# Patient Record
Sex: FEMALE | Race: WHITE | Employment: FULL TIME | URBAN - METROPOLITAN AREA
[De-identification: names, ages, dates, MRNs, and addresses within clinical notes are randomized per-mention and may not be internally consistent; named-entity substitution may affect disease eponyms.]

---

## 2017-11-17 ENCOUNTER — TELEPHONE (OUTPATIENT)
Dept: NEUROSURGERY | Facility: CLINIC | Age: 32
End: 2017-11-17

## 2017-11-17 DIAGNOSIS — M54.16 LUMBAR RADICULAR PAIN: Primary | ICD-10-CM

## 2017-11-17 NOTE — TELEPHONE ENCOUNTER
Spoke to patient informed her that message will be sent to Sandhya Swartz CNP for her preference and will get back to her on Monday. Patient verbalized understanding.

## 2017-11-17 NOTE — TELEPHONE ENCOUNTER
REASON FOR CALL:  Pt saw Sandhya about a year ago and she was referred to get an MRI; states she never got the MRI. However, now she would like to have an MRI done for the same issue, but the order has . Can she get a new order for one without being seen or will she need to come in and re-evaluate with Sandhya again? She can do either that is required.     Detailed message can be left:  YES

## 2017-11-21 NOTE — TELEPHONE ENCOUNTER
Ok per Sandhya Swartz CNP to order new lumbar MRI and will call patient with results and plan. Called and discussed with patient. She verbalized understanding. Order placed in EPIC. Provided patient with central scheduling number to set up MRI.

## 2017-11-22 ENCOUNTER — HOSPITAL ENCOUNTER (OUTPATIENT)
Dept: MRI IMAGING | Facility: CLINIC | Age: 32
Discharge: HOME | End: 2017-11-22
Attending: NURSE PRACTITIONER | Admitting: NURSE PRACTITIONER
Payer: COMMERCIAL

## 2017-11-22 DIAGNOSIS — M54.16 LUMBAR RADICULAR PAIN: ICD-10-CM

## 2017-11-22 PROCEDURE — 72148 MRI LUMBAR SPINE W/O DYE: CPT

## 2017-11-28 ENCOUNTER — TELEPHONE (OUTPATIENT)
Dept: NEUROSURGERY | Facility: CLINIC | Age: 32
End: 2017-11-28

## 2017-11-29 ENCOUNTER — TELEPHONE (OUTPATIENT)
Dept: NEUROSURGERY | Facility: CLINIC | Age: 32
End: 2017-11-29

## 2017-11-29 DIAGNOSIS — M54.50 CHRONIC RIGHT-SIDED LOW BACK PAIN WITHOUT SCIATICA: Primary | ICD-10-CM

## 2017-11-29 DIAGNOSIS — G89.29 CHRONIC RIGHT-SIDED LOW BACK PAIN WITHOUT SCIATICA: Primary | ICD-10-CM

## 2017-11-29 NOTE — TELEPHONE ENCOUNTER
Patient returned call to review MRI results.  Lumbar MRI results normal.  Patient interested in PT, will place order.

## 2018-01-22 ENCOUNTER — THERAPY VISIT (OUTPATIENT)
Dept: PHYSICAL THERAPY | Facility: CLINIC | Age: 33
End: 2018-01-22
Payer: COMMERCIAL

## 2018-01-22 DIAGNOSIS — G89.29 CHRONIC BILATERAL LOW BACK PAIN WITHOUT SCIATICA: Primary | ICD-10-CM

## 2018-01-22 DIAGNOSIS — M54.50 CHRONIC BILATERAL LOW BACK PAIN WITHOUT SCIATICA: Primary | ICD-10-CM

## 2018-01-22 PROCEDURE — 97110 THERAPEUTIC EXERCISES: CPT | Mod: GP | Performed by: PHYSICAL THERAPIST

## 2018-01-22 PROCEDURE — 97161 PT EVAL LOW COMPLEX 20 MIN: CPT | Mod: GP | Performed by: PHYSICAL THERAPIST

## 2018-01-22 PROCEDURE — 97112 NEUROMUSCULAR REEDUCATION: CPT | Mod: GP | Performed by: PHYSICAL THERAPIST

## 2018-01-22 NOTE — PROGRESS NOTES
[facility] Initial Evaluation  Subjective:  Patient is a 32 year old female presenting with rehab back hpi. The history is provided by the patient. No  was used.   Reva Garza is a 32 year old female with a lumbar condition.  Occurance: noticed onset of LBP 2 years ago after giving birth to her son.  Condition occurred: for unknown reasons.  This is a chronic condition  Most recent exacerbation of symptoms occurred approximately one month ago for reasons unknown.  Patient was referred to PT 11/29/17.    Patient reports pain:  Lumbar spine right, lumbar spine left and central lumbar spine.  Radiates to: tingling in bilateral buttocks at times.  Pain is described as aching and is constant and reported as 6/10.  Associated symptoms:  Tingling. Pain is worse in the P.M. and worse in the A.M..  Symptoms are exacerbated by bending, lifting, sitting and other (sit to stand transitions, especially when sitting on floor with her son; coughing) and relieved by heat.  Since onset symptoms are gradually worsening.  Special tests:  MRI (normal).      General health as reported by patient is good.  Pertinent medical history includes:  None.    Surgical history: Appendectomy, breast augmentation.  Current medications:  None as reported by the patient.  Current occupation is .  Patient is working in normal job without restrictions.  Primary job tasks include:  Prolonged sitting and other (computer work).    Barriers include:  None as reported by the patient.    Red flags:  Pain at rest/night.                        Objective:        Flexibility/Screens:   Positive screens:  LumbarNegative screens: Hip         Lower Extremity:  Normal        Physical Exam      [place] Lumbar Evaluation    Posture:  Sitting: poor  Standing: fair  Lordosis: WNL  Lateral Shift: no  Correction of Posture: better  Other Observations: Mild mid lumbar scoliosis convex right  Movement  Loss:  Flexion (Flex): nil and pain  Extension (EXT): major and pain  Side Ramah R (SG R): nil  Side Glide L (SG L): nil and pain  Test Movements:  FIS: Pretest Movements: Bilat LBP        EIL: During: increases    Repeat EIL: During: decreases  After: better  Mechanical Response: IncROM        Conclusion: derangement  Principle of Treatment:  Posture Correction: Slouch-overcorrect 3x/day; use of lumbar roll in sitting    Extension: Repeated EIL 10x every 2-3 hrs      General Evaluation:          Lower Extremity Flexibility:  normal                                                                             ROS    Assessment/Plan:    Patient is a 32 year old female with lumbar complaints.    Patient has the following significant findings with corresponding treatment plan.                Diagnosis 1:  LBP  Pain -  self management, education, directional preference exercise and home program  Decreased ROM/flexibility - therapeutic exercise  Decreased function - therapeutic activities and home program  Impaired posture - neuro re-education    Therapy Evaluation Codes:   1) History comprised of:   Personal factors that impact the plan of care:      Time since onset of symptoms.    Comorbidity factors that impact the plan of care are:      None.     Medications impacting care: None.  2) Examination of Body Systems comprised of:   Body structures and functions that impact the plan of care:      Lumbar spine.   Activity limitations that impact the plan of care are:      Bending, Lifting, Sitting and sit to stand transfers.  3) Clinical presentation characteristics are:   Stable/Uncomplicated.  4) Decision-Making    Low complexity using standardized patient assessment instrument and/or measureable assessment of functional outcome.  Cumulative Therapy Evaluation is: Low complexity.    Previous and current functional limitations:  (See Goal Flow Sheet for this information)    Short term and Long term goals: (See Goal Flow  Sheet for this information)     Communication ability:  Patient appears to be able to clearly communicate and understand verbal and written communication and follow directions correctly.  Treatment Explanation - The following has been discussed with the patient:    RX ordered/plan of care  Anticipated outcomes  Possible risks and side effects  This patient would benefit from PT intervention to resume normal activities.   Rehab potential is good.    Frequency:  1 X week, once daily  Duration:  for 6 weeks  Discharge Plan:  Achieve all LTG.  Independent in home treatment program.  Reach maximal therapeutic benefit.    Please refer to the daily flowsheet for treatment today, total treatment time and time spent performing 1:1 timed codes.

## 2018-01-22 NOTE — MR AVS SNAPSHOT
After Visit Summary   1/22/2018    Reva Garza    MRN: [id number]           Patient Information     Date Of Birth          1985        Visit Information        Provider Department      1/22/2018 4:30 PM Rissa Hung PT [facility]        Today's Diagnoses     Chronic bilateral low back pain without sciatica    -  1       Follow-ups after your visit        Who to contact     If you have questions or need follow up information about today's clinic visit or your schedule please contact [facility] directly at [phone].  Normal or non-critical lab and imaging results will be communicated to you by [employer], letter or phone within 4 business days after the clinic has received the results. If you do not hear from us within 7 days, please contact the clinic through [employer] or phone. If you have a critical or abnormal lab result, we will notify you by phone as soon as possible.  Submit refill requests through [employer] or call your pharmacy and they will forward the refill request to us. Please allow 3 business days for your refill to be completed.          Additional Information About Your Visit        MyChart Information     [employer] gives you secure access to your electronic health record. If you see a primary care provider, you can also send messages to your care team and make appointments. If you have questions, please call your primary care clinic.  If you do not have a primary care provider, please call [phone] and they will assist you.        Care EveryWhere ID     This is your Care EveryWhere ID. This could be used by other organizations to access your [facility] medical records  [id number]         Blood Pressure from Last 3 Encounters:   12/12/16 143/90   10/12/16 125/88   10/12/16 126/87    Weight from Last 3 Encounters:   12/12/16 61.9 kg (136 lb 6.4 oz)   10/12/16 61.5 kg (135 lb 9.6 oz)   10/12/16 61.2 kg (135  cornelius)              We Performed the Following     HC PT EVAL, LOW COMPLEXITY     CRISTAL INITIAL EVAL REPORT     NEUROMUSCULAR RE-EDUCATION     THERAPEUTIC EXERCISES        Primary Care Provider Office Phone # Fax #    [facility] [phone] [address] [address] [address]        Equal Access to Services     KARINA SUTTON : Hadii aad ku hadasho Soomaali, waaxda luqadaha, qaybta kaalmada adeegyada, waxay elisein hayantonion marci jeaninejudy kenney . So [facility] [phone].    ATENCIÓN: Si habla español, tiene a schulte disposición servicios gratuitos de asistencia lingüística. Stuart al [phone].    We comply with applicable federal civil rights laws and [place] laws. We do not discriminate on the basis of race, color, national origin, age, disability, sex, sexual orientation, or gender identity.            Thank you!     Thank you for choosing [facility]  for your care. Our goal is always to provide you with excellent care. Hearing back from our patients is one way we can continue to improve our services. Please take a few minutes to complete the written survey that you may receive in the mail after your visit with us. Thank you!             Your Updated Medication List - Protect others around you: Learn how to safely use, store and throw away your medicines at www.disposemymeds.org.          This list is accurate as of: 1/22/18  5:17 PM.  Always use your most recent med list.                   Brand Name Dispense Instructions for use Diagnosis    busPIRone 5 MG tablet    BUSPAR    90 tablet    Take 1-2 tablets (5-10 mg) by mouth 3 times daily    Abdominal pain, epigastric       Dapsone 5 % Gel           hyoscyamine 0.125 MG sublingual tablet    LEVSIN/SL    60 tablet    Place 1 tablet (0.125 mg) under the tongue every 4 hours as needed for cramping    Abdominal cramping, Constipation, unspecified constipation type       norethindrone-ethinyl estradiol 1-20 MG-MCG  per tablet    JUNEL FE 1/20     Take 1 tablet by mouth daily        penicillin V potassium 500 MG tablet    VEETID    20 tablet    Take 1 tablet (500 mg) by mouth 2 times daily    Throat pain       spironolactone 100 MG tablet    ALDACTONE     Take 100 mg by mouth 2 times daily

## 2018-02-06 ENCOUNTER — THERAPY VISIT (OUTPATIENT)
Dept: PHYSICAL THERAPY | Facility: CLINIC | Age: 33
End: 2018-02-06
Payer: COMMERCIAL

## 2018-02-06 DIAGNOSIS — G89.29 CHRONIC BILATERAL LOW BACK PAIN WITHOUT SCIATICA: ICD-10-CM

## 2018-02-06 DIAGNOSIS — M54.50 CHRONIC BILATERAL LOW BACK PAIN WITHOUT SCIATICA: ICD-10-CM

## 2018-02-06 PROCEDURE — 97110 THERAPEUTIC EXERCISES: CPT | Mod: GP | Performed by: PHYSICAL THERAPIST

## 2018-02-06 PROCEDURE — 97112 NEUROMUSCULAR REEDUCATION: CPT | Mod: GP | Performed by: PHYSICAL THERAPIST

## 2018-03-03 ENCOUNTER — HEALTH MAINTENANCE LETTER (OUTPATIENT)
Age: 33
End: 2018-03-03

## 2018-03-05 ENCOUNTER — THERAPY VISIT (OUTPATIENT)
Dept: PHYSICAL THERAPY | Facility: CLINIC | Age: 33
End: 2018-03-05
Payer: COMMERCIAL

## 2018-03-05 DIAGNOSIS — G89.29 CHRONIC BILATERAL LOW BACK PAIN WITHOUT SCIATICA: ICD-10-CM

## 2018-03-05 DIAGNOSIS — M54.50 CHRONIC BILATERAL LOW BACK PAIN WITHOUT SCIATICA: ICD-10-CM

## 2018-03-05 PROCEDURE — 97110 THERAPEUTIC EXERCISES: CPT | Mod: GP | Performed by: PHYSICAL THERAPIST

## 2018-05-30 ASSESSMENT — ENCOUNTER SYMPTOMS
INCREASED ENERGY: 1
DIARRHEA: 1
CONSTIPATION: 1
BLOATING: 1
FATIGUE: 1
NAUSEA: 1
ABDOMINAL PAIN: 1
HEARTBURN: 1

## 2018-06-13 ENCOUNTER — OFFICE VISIT (OUTPATIENT)
Dept: GASTROENTEROLOGY | Facility: CLINIC | Age: 33
End: 2018-06-13
Payer: COMMERCIAL

## 2018-06-13 VITALS
HEART RATE: 84 BPM | WEIGHT: 141.5 LBS | OXYGEN SATURATION: 96 % | DIASTOLIC BLOOD PRESSURE: 97 MMHG | TEMPERATURE: 98.4 F | HEIGHT: 66 IN | SYSTOLIC BLOOD PRESSURE: 127 MMHG | BODY MASS INDEX: 22.74 KG/M2

## 2018-06-13 DIAGNOSIS — R10.84 ABDOMINAL PAIN, GENERALIZED: Primary | ICD-10-CM

## 2018-06-13 RX ORDER — LOPERAMIDE HCL 2 MG
2 CAPSULE ORAL
COMMUNITY
Start: 2015-07-05

## 2018-06-13 RX ORDER — HYOSCYAMINE SULFATE 0.125 MG
0.125-0.25 TABLET ORAL 2 TIMES DAILY PRN
Qty: 40 TABLET | Refills: 1 | Status: SHIPPED | OUTPATIENT
Start: 2018-06-13

## 2018-06-13 RX ORDER — ACETAMINOPHEN 325 MG/1
325-650 TABLET ORAL
COMMUNITY
Start: 2015-07-05 | End: 2018-11-14

## 2018-06-13 RX ORDER — ERGOCALCIFEROL (VITAMIN D2) 50 MCG
CAPSULE ORAL
Refills: 0 | COMMUNITY
Start: 2018-05-03

## 2018-06-13 RX ORDER — DAPSONE 75 MG/G
GEL TOPICAL
Refills: 3 | COMMUNITY
Start: 2018-05-02 | End: 2018-11-14

## 2018-06-13 RX ORDER — ONDANSETRON 4 MG/1
TABLET, ORALLY DISINTEGRATING ORAL
Refills: 0 | COMMUNITY
Start: 2018-01-09 | End: 2018-11-14

## 2018-06-13 ASSESSMENT — PAIN SCALES - GENERAL: PAINLEVEL: NO PAIN (0)

## 2018-06-13 NOTE — PROGRESS NOTES
GI CLINIC VISIT    CC/REFERRING MD:  Referred Self  REASON FOR CONSULTATION:   Referred Self for   Chief Complaint   Patient presents with     Gastrointestinal Problem     Follow up for Irritable Bowel Syndrom       ASSESSMENT/PLAN:  Ms. Ravi Garza is a 31 yo woman presenting for follow up of chronic functional abdominal pain, GERD and idiopathic constipation under good control with Miralax.  She did have an isolated episode of enterocolitis that self-resolved about 6 months ago; she had a similar episode a year ago.     # Constipation  Alternating between imodium (for travel) and Miralax  --Prior to travel, stop Miralax x 2-3 days to avoid imodium use. This will simplify medication use and will hopefully provide good bowel control.     # GERD on PPI with persistent symptoms  --Take omeprazole 20 mg twice daily on empty stomach 30-60 min before food. OK to add H2B QHS.     # Anxiety with chronic abdominal discomfort  --Referral to Dr. Reva Nogueira   --Hyoscyamine prescribed, per patient request     # Enterocolitis, several episodes   If diarrheal illnesses recurs, consider testing for IgA deficiency and giardia.     RTC: Return if symptoms worsen or fail to improve.     A total of 40 minutes, face to face, was spent with this patient, >50% of which was counseling regarding the above delineated issues.    Melina Esparza MD   of Medicine  Division of Gastroenterology, Hepatology and Nutrition  [facility]      HPI  Ms. Ravi Garza is a 31 yo woman previously seen by Noemí Borjas (last seen 10/2016 for constipatio) presenting for follow up.     In Jan 2018, Ms. Ravi Garza presented to the ED ([facility]) for diarrhea and abdominal pain. CT a/p showed enterocolitis. Treated with supportive therapy. Symptoms self-resolved. Denies any travel. Son was sick with similar symptoms prior to presentation.     Bowel habits have been well-controlled with alternating use of Miralax and  loperamide.   Generally, uses Miralax daily to achieve a BM daily.  When she travels, she takes empiric imodium (given histroy of incontinence). This will lead to no BMs x 2 days. Afterwards, she takes Miralax to regain stooling.  Endorses occasional hemorrhoids without pain. No blood per rectum or pain with defecation.    Endorses chronic, generalized abdominal discomfort related to anxiety. Tried Buspar for anxiety; had side effects in the form of palpitations. In the past, she tried probiotic (flurastor) and had diarrhea. No weight loss.     Takes omeprazole 20 mg BID for belching/reflux. Takes this on an empty stomach before breakfast and after dinner. Symptoms worst after dinner.      ROS:    No fevers or chills  No weight loss  No blurry vision, double vision or change in vision  No sore throat  No lymphadenopathy  No headache, paraesthesias, or weakness in a limb  No shortness of breath or wheezing  No chest pain or pressure  No arthralgias or myalgias  No rashes or skin changes  No odynophagia or dysphagia  No BRBPR, hematochezia, melena  No dysuria, frequency or urgency  No hot/cold intolerance or polyria  + anxiety    PROBLEM LIST  Patient Active Problem List    Diagnosis Date Noted     Chronic bilateral low back pain without sciatica 01/22/2018     Priority: Medium     CARDIOVASCULAR SCREENING; LDL GOAL LESS THAN 160 02/10/2010     Priority: Medium     External hemorrhoids 11/06/2005     Priority: Medium     Problem list name updated by automated process. Provider to review       Irritable bowel syndrome 11/06/2005     Priority: Medium     PERTINENT PAST MEDICAL HISTORY:  Past Medical History:   Diagnosis Date     Corpus luteum cyst or hematoma 1/2004    ruptured with ER eval and resolution, had f/u with GYN physician Dr. Arroyo.        PREVIOUS SURGERIES:  Past Surgical History:   Procedure Laterality Date     APPENDECTOMY  7/2011     BREAST SURGERY       PREVIOUS ENDOSCOPY:  cscope at [facility] about 10  years ago and it was normal.   EGD in 2015 with bx normal    ALLERGIES:  Allergies   Allergen Reactions     No Known Drug Allergies      PERTINENT MEDICATIONS:    Current Outpatient Prescriptions:      acetaminophen (TYLENOL) 325 MG tablet, Take 325-650 mg by mouth, Disp: , Rfl:      ACZONE 7.5 % gel, APPLY A PEA SIZED AMOUNT TO FULL FACE AND THIN LAYER TO CHEST QD FOR ACNE, Disp: , Rfl: 3     Dapsone 5 % GEL, , Disp: , Rfl:      hyoscyamine (ANASPAZ/LEVSIN) 0.125 MG tablet, Take 1-2 tablets (125-250 mcg) by mouth 2 times daily as needed for cramping, Disp: 40 tablet, Rfl: 1     loperamide (IMODIUM) 2 MG capsule, 2 mg, Disp: , Rfl:      norethindrone-ethinyl estradiol (JUNEL FE 1/20) 1-20 MG-MCG per tablet, Take 1 tablet by mouth daily, Disp: , Rfl:      ondansetron (ZOFRAN-ODT) 4 MG ODT tab, DIS ONE T PO Q 8 H PRN N, Disp: , Rfl: 0     spironolactone (ALDACTONE) 100 MG tablet, Take 100 mg by mouth 2 times daily, Disp: , Rfl:      tazarotene 0.05 % CREA cream, , Disp: , Rfl:      vitamin D (ERGOCALCIFEROL) 64661 UNIT capsule, TK 1 C PO ONCE WEEKLY, Disp: , Rfl: 0     busPIRone (BUSPAR) 5 MG tablet, Take 1-2 tablets (5-10 mg) by mouth 3 times daily (Patient not taking: Reported on 6/13/2018), Disp: 90 tablet, Rfl: 1     hyoscyamine (LEVSIN/SL) 0.125 MG SL tablet, Place 1 tablet (0.125 mg) under the tongue every 4 hours as needed for cramping (Patient not taking: Reported on 6/13/2018), Disp: 60 tablet, Rfl: 1    SOCIAL HISTORY:  Social History     Social History     Marital status:      Spouse name: N/A     Number of children: 0     Years of education: N/A     Occupational History     Not on file.     Social History Main Topics     Smoking status: Never Smoker     Smokeless tobacco: Never Used     Alcohol use 0.0 oz/week     0 Standard drinks or equivalent per week      Comment: socially     Drug use: No     Sexual activity: Yes     Partners: Male     Birth control/ protection: Pill      Comment: BCP- ortho  "earline     Other Topics Concern     Not on file     Social History Narrative     FAMILY HISTORY:  Family History   Problem Relation Age of Onset     Hypertension Mother      Lipids Mother      Breast Cancer Mother 61     breast     DIABETES Father      Hypertension Father      Lipids Father      Hypertension Paternal Grandmother      Lipids Paternal Grandmother      HEART DISEASE Maternal Grandfather      Lipids Paternal Grandfather      Hypertension Paternal Grandfather      Irritable Bowel Syndrome Father      and brother     Gallbladder Disease Paternal Grandfather      GMa, 2aunts, cousin     Thyroid Disease Maternal Grandmother      Depression Mother      Past/family/social history reviewed and no changes    PHYSICAL EXAMINATION:  Constitutional: aaox3, cooperative, pleasant, not dyspneic/diaphoretic, no acute distress  Vitals reviewed: BP (!) 127/97 (BP Location: Left arm, Patient Position: Sitting, Cuff Size: Adult Regular)  Pulse 84  Temp 98.4  F (36.9  C) (Oral)  Ht 1.676 m (5' 6\")  Wt 64.2 kg (141 lb 8 oz)  SpO2 96%  BMI 22.84 kg/m2  Wt:   Wt Readings from Last 2 Encounters:   06/13/18 64.2 kg (141 lb 8 oz)   12/12/16 61.9 kg (136 lb 6.4 oz)      Eyes: Sclera anicteric/injected  Ears/nose/mouth/throat: Normal oropharynx without ulcers or exudate, mucus membranes moist, hearing intact  Neck: supple, thyroid normal size  CV: No edema  Respiratory: Unlabored breathing  Lymph: No axillary, submandibular, supraclavicular or inguinal lymphadenopathy  Abd: soft, nondistended, +bs, no hepatosplenomegaly, nontender, no peritoneal signs  Skin: warm, perfused, no jaundice  Psych: Normal affect  MSK: Normal gait  Rectal: declined    PERTINENT STUDIES:  Office Visit on 12/12/2016   Component Date Value Ref Range Status     Specimen Description 12/12/2016 Throat   Final     Rapid Strep A Screen 12/12/2016    Final                    Value:NEGATIVE: No Group A streptococcal antigen detected by immunoassay, await   " culture report.       Micro Report Status 12/12/2016 FINAL 12/12/2016   Final     Specimen Description 12/12/2016 Throat   Final     Culture Micro 12/12/2016 No Beta Streptococcus isolated   Final     Micro Report Status 12/12/2016 FINAL 12/14/2016   Final     Mononucleosis Screen 12/12/2016 Negative  NEG Final     Influenza A/B Agn Specimen 12/12/2016 Nasopharyngeal   Final     Influenza A 12/12/2016   NEG Final                    Value:Negative   Test results must be correlated with clinical data. If necessary, results   should be confirmed by a molecular assay or viral culture.       Influenza B 12/12/2016   NEG Final                    Value:Negative   Test results must be correlated with clinical data. If necessary, results   should be confirmed by a molecular assay or viral culture.

## 2018-06-13 NOTE — PATIENT INSTRUCTIONS
--Prior to travel, consider stopping Miralax for two-three days and avoid imodium use.   --Referral to Dr. Nogueira for anxiety  --Continue omeprazole 20 mg. Take this twice daily on an empty stomach 30-60 minutes before food. If additional coverage is needed, OK to take pepcid or zantac.

## 2018-06-13 NOTE — NURSING NOTE
"Chief Complaint   Patient presents with     Gastrointestinal Problem     Follow up for Irritable Bowel Syndrom       Vitals:    06/13/18 1612   BP: (!) 127/97   BP Location: Left arm   Patient Position: Sitting   Cuff Size: Adult Regular   Pulse: 84   Temp: 98.4  F (36.9  C)   TempSrc: Oral   SpO2: 96%   Weight: 64.2 kg (141 lb 8 oz)   Height: 1.676 m (5' 6\")       Body mass index is 22.84 kg/(m^2).      Christa Gomez LPN                          "

## 2018-06-13 NOTE — LETTER
6/13/2018       RE: Reva Garza  [address] [place] [address]     Dear Colleague,    Thank you for referring your patient, Reva Garza, to the [facility] GASTROENTEROLOGY AND IBD CLINIC at [facility]. Please see a copy of my visit note below.    GI CLINIC VISIT    CC/REFERRING MD:  Referred Self  REASON FOR CONSULTATION:   Referred Self for   Chief Complaint   Patient presents with     Gastrointestinal Problem     Follow up for Irritable Bowel Syndrom       ASSESSMENT/PLAN:  Ms. Ravi Garza is a 33 yo woman presenting for follow up of chronic functional abdominal pain, GERD and idiopathic constipation under good control with Miralax.  She did have an isolated episode of enterocolitis that self-resolved about 6 months ago; she had a similar episode a year ago.     # Constipation  Alternating between imodium (for travel) and Miralax  --Prior to travel, stop Miralax x 2-3 days to avoid imodium use. This will simplify medication use and will hopefully provide good bowel control.     # GERD on PPI with persistent symptoms  --Take omeprazole 20 mg twice daily on empty stomach 30-60 min before food. OK to add H2B QHS.     # Anxiety with chronic abdominal discomfort  --Referral to Dr. Reva Nogueira   --Hyoscyamine prescribed, per patient request     # Enterocolitis, several episodes   If diarrheal illnesses recurs, consider testing for IgA deficiency and giardia.     RTC: Return if symptoms worsen or fail to improve.     A total of 40 minutes, face to face, was spent with this patient, >50% of which was counseling regarding the above delineated issues.    Melina Esparza MD   of Medicine  Division of Gastroenterology, Hepatology and Nutrition  [facility]      HPI  Ms. Ravi Garza is a 33 yo woman previously seen by Noemí Borjas (last seen 10/2016 for constipatio) presenting for follow up.     In Jan 2018, Ms. Ravi Garza  presented to the ED ([facility]) for diarrhea and abdominal pain. CT a/p showed enterocolitis. Treated with supportive therapy. Symptoms self-resolved. Denies any travel. Son was sick with similar symptoms prior to presentation.     Bowel habits have been well-controlled with alternating use of Miralax and loperamide.   Generally, uses Miralax daily to achieve a BM daily.  When she travels, she takes empiric imodium (given histroy of incontinence). This will lead to no BMs x 2 days. Afterwards, she takes Miralax to regain stooling.  Endorses occasional hemorrhoids without pain. No blood per rectum or pain with defecation.    Endorses chronic, generalized abdominal discomfort related to anxiety. Tried Buspar for anxiety; had side effects in the form of palpitations. In the past, she tried probiotic (flurastor) and had diarrhea. No weight loss.     Takes omeprazole 20 mg BID for belching/reflux. Takes this on an empty stomach before breakfast and after dinner. Symptoms worst after dinner.      PROBLEM LIST  Patient Active Problem List    Diagnosis Date Noted     Chronic bilateral low back pain without sciatica 01/22/2018     Priority: Medium     CARDIOVASCULAR SCREENING; LDL GOAL LESS THAN 160 02/10/2010     Priority: Medium     External hemorrhoids 11/06/2005     Priority: Medium     Problem list name updated by automated process. Provider to review       Irritable bowel syndrome 11/06/2005     Priority: Medium     PERTINENT PAST MEDICAL HISTORY:  Past Medical History:   Diagnosis Date     Corpus luteum cyst or hematoma 1/2004    ruptured with ER eval and resolution, had f/u with GYN physician Dr. Arroyo.        PREVIOUS SURGERIES:  Past Surgical History:   Procedure Laterality Date     APPENDECTOMY  7/2011     BREAST SURGERY       PREVIOUS ENDOSCOPY:  cscope at [facility] about 10 years ago and it was normal.   EGD in 2015 with bx normal    ALLERGIES:  Allergies   Allergen Reactions     No Known Drug Allergies       PERTINENT MEDICATIONS:    Current Outpatient Prescriptions:      acetaminophen (TYLENOL) 325 MG tablet, Take 325-650 mg by mouth, Disp: , Rfl:      ACZONE 7.5 % gel, APPLY A PEA SIZED AMOUNT TO FULL FACE AND THIN LAYER TO CHEST QD FOR ACNE, Disp: , Rfl: 3     Dapsone 5 % GEL, , Disp: , Rfl:      hyoscyamine (ANASPAZ/LEVSIN) 0.125 MG tablet, Take 1-2 tablets (125-250 mcg) by mouth 2 times daily as needed for cramping, Disp: 40 tablet, Rfl: 1     loperamide (IMODIUM) 2 MG capsule, 2 mg, Disp: , Rfl:      norethindrone-ethinyl estradiol (JUNEL FE 1/20) 1-20 MG-MCG per tablet, Take 1 tablet by mouth daily, Disp: , Rfl:      ondansetron (ZOFRAN-ODT) 4 MG ODT tab, DIS ONE T PO Q 8 H PRN N, Disp: , Rfl: 0     spironolactone (ALDACTONE) 100 MG tablet, Take 100 mg by mouth 2 times daily, Disp: , Rfl:      tazarotene 0.05 % CREA cream, , Disp: , Rfl:      vitamin D (ERGOCALCIFEROL) 27342 UNIT capsule, TK 1 C PO ONCE WEEKLY, Disp: , Rfl: 0     busPIRone (BUSPAR) 5 MG tablet, Take 1-2 tablets (5-10 mg) by mouth 3 times daily (Patient not taking: Reported on 6/13/2018), Disp: 90 tablet, Rfl: 1     hyoscyamine (LEVSIN/SL) 0.125 MG SL tablet, Place 1 tablet (0.125 mg) under the tongue every 4 hours as needed for cramping (Patient not taking: Reported on 6/13/2018), Disp: 60 tablet, Rfl: 1    SOCIAL HISTORY:  Social History     Social History     Marital status:      Spouse name: N/A     Number of children: 0     Years of education: N/A     Occupational History     Not on file.     Social History Main Topics     Smoking status: Never Smoker     Smokeless tobacco: Never Used     Alcohol use 0.0 oz/week     0 Standard drinks or equivalent per week      Comment: socially     Drug use: No     Sexual activity: Yes     Partners: Male     Birth control/ protection: Pill      Comment: BCP- ortho trinesa     Other Topics Concern     Not on file     Social History Narrative     FAMILY HISTORY:  Family History   Problem Relation  "Age of Onset     Hypertension Mother      Lipids Mother      Breast Cancer Mother 61     breast     DIABETES Father      Hypertension Father      Lipids Father      Hypertension Paternal Grandmother      Lipids Paternal Grandmother      HEART DISEASE Maternal Grandfather      Lipids Paternal Grandfather      Hypertension Paternal Grandfather      Irritable Bowel Syndrome Father      and brother     Gallbladder Disease Paternal Grandfather      GMa, 2aunts, cousin     Thyroid Disease Maternal Grandmother      Depression Mother      Past/family/social history reviewed and no changes    PHYSICAL EXAMINATION:  Constitutional: aaox3, cooperative, pleasant, not dyspneic/diaphoretic, no acute distress  Vitals reviewed: BP (!) 127/97 (BP Location: Left arm, Patient Position: Sitting, Cuff Size: Adult Regular)  Pulse 84  Temp 98.4  F (36.9  C) (Oral)  Ht 1.676 m (5' 6\")  Wt 64.2 kg (141 lb 8 oz)  SpO2 96%  BMI 22.84 kg/m2  Wt:   Wt Readings from Last 2 Encounters:   06/13/18 64.2 kg (141 lb 8 oz)   12/12/16 61.9 kg (136 lb 6.4 oz)      Eyes: Sclera anicteric/injected  Ears/nose/mouth/throat: Normal oropharynx without ulcers or exudate, mucus membranes moist, hearing intact  Neck: supple, thyroid normal size  CV: No edema  Respiratory: Unlabored breathing  Lymph: No axillary, submandibular, supraclavicular or inguinal lymphadenopathy  Abd: soft, nondistended, +bs, no hepatosplenomegaly, nontender, no peritoneal signs  Skin: warm, perfused, no jaundice  Psych: Normal affect  MSK: Normal gait  Rectal: declined    PERTINENT STUDIES:  Office Visit on 12/12/2016   Component Date Value Ref Range Status     Specimen Description 12/12/2016 Throat   Final     Rapid Strep A Screen 12/12/2016    Final                    Value:NEGATIVE: No Group A streptococcal antigen detected by immunoassay, await   culture report.       Micro Report Status 12/12/2016 FINAL 12/12/2016   Final     Specimen Description 12/12/2016 Throat   Final     " Culture Micro 12/12/2016 No Beta Streptococcus isolated   Final     Micro Report Status 12/12/2016 FINAL 12/14/2016   Final     Mononucleosis Screen 12/12/2016 Negative  NEG Final     Influenza A/B Agn Specimen 12/12/2016 Nasopharyngeal   Final     Influenza A 12/12/2016   NEG Final                    Value:Negative   Test results must be correlated with clinical data. If necessary, results   should be confirmed by a molecular assay or viral culture.       Influenza B 12/12/2016   NEG Final                    Value:Negative   Test results must be correlated with clinical data. If necessary, results   should be confirmed by a molecular assay or viral culture.         Again, thank you for allowing me to participate in the care of your patient.      Sincerely,    Melina Esparza MD

## 2018-06-13 NOTE — MR AVS SNAPSHOT
After Visit Summary   6/13/2018    Reva Garza    MRN: [id number]           Patient Information     Date Of Birth          1985        Visit Information        Provider Department      6/13/2018 4:20 PM Melina Esparza MD [facility] Gastroenterology and IBD [facility]        Today's Diagnoses     Abdominal pain, generalized    -  1      Care Instructions    --Prior to travel, consider stopping Miralax for two-three days and avoid imodium use.   --Referral to Dr. Nogueira for anxiety  --Continue omeprazole 20 mg. Take this twice daily on an empty stomach 30-60 minutes before food. If additional coverage is needed, OK to take pepcid or zantac.           Follow-ups after your visit        Follow-up notes from your care team     Return if symptoms worsen or fail to improve.      Who to contact     Please call your clinic at [phone] to:    Ask questions about your health    Make or cancel appointments    Discuss your medicines    Learn about your test results    Speak to your doctor            Additional Information About Your Visit        [employer] Information     [employer] gives you secure access to your electronic health record. If you see a primary care provider, you can also send messages to your care team and make appointments. If you have questions, please call your primary care clinic.  If you do not have a primary care provider, please call [phone] and they will assist you.      [employer] is an electronic gateway that provides easy, online access to your medical records. With [employer], you can request a clinic appointment, read your test results, renew a prescription or communicate with your care team.     To access your existing account, please contact your [facility] or call [phone] for assistance.        Care EveryWhere ID     This is your Care EveryWhere ID. This could be used by other organizations to access your [facility]  "records  [id number]        Your Vitals Were     Pulse Temperature Height Pulse Oximetry BMI (Body Mass Index)       84 98.4  F (36.9  C) (Oral) 1.676 m (5' 6\") 96% 22.84 kg/m2        Blood Pressure from Last 3 Encounters:   06/13/18 (!) 127/97   12/12/16 143/90   10/12/16 125/88    Weight from Last 3 Encounters:   06/13/18 64.2 kg (141 lb 8 oz)   12/12/16 61.9 kg (136 lb 6.4 oz)   10/12/16 61.5 kg (135 lb 9.6 oz)              Today, you had the following     No orders found for display         Today's Medication Changes          These changes are accurate as of 6/13/18  5:06 PM.  If you have any questions, ask your nurse or doctor.               These medicines have changed or have updated prescriptions.        Dose/Directions    * hyoscyamine 0.125 MG sublingual tablet   Commonly known as:  LEVSIN/SL   This may have changed:  Another medication with the same name was added. Make sure you understand how and when to take each.   Used for:  Abdominal cramping, Constipation, unspecified constipation type   Changed by:  Melina Esparza MD        Dose:  0.125 mg   Place 1 tablet (0.125 mg) under the tongue every 4 hours as needed for cramping   Quantity:  60 tablet   Refills:  1       * hyoscyamine 0.125 MG tablet   Commonly known as:  ANASPAZ/LEVSIN   This may have changed:  You were already taking a medication with the same name, and this prescription was added. Make sure you understand how and when to take each.   Used for:  Abdominal pain, generalized   Changed by:  Melina Esparza MD        Dose:  0.125-0.25 mg   Take 1-2 tablets (125-250 mcg) by mouth 2 times daily as needed for cramping   Quantity:  40 tablet   Refills:  1       * Notice:  This list has 2 medication(s) that are the same as other medications prescribed for you. Read the directions carefully, and ask your doctor or other care provider to review them with you.         Where to get your medicines      These medications were sent to [id number] - [place] - [address]  [address] [place] [address]     Phone:  [phone]     hyoscyamine 0.125 MG tablet                Primary Care Provider Office Phone # Fax #    [facility] [place] [phone] [address] [address]        Equal Access to Services     [facility] : Hadii aad ku hadasho Soomaali, waaxda luqadaha, qaybta kaalmada adeegyada, waxay idiin hayaan adeeg kharash la'aan . So [facility] [phone].    ATENCIÓN: Si habla español, tiene a schulte disposición servicios gratuitos de asistencia lingüística. [facility] [phone].    We comply with applicable federal civil rights laws and [place] laws. We do not discriminate on the basis of race, color, national origin, age, disability, sex, sexual orientation, or gender identity.            Thank you!     Thank you for choosing [facility] GASTROENTEROLOGY AND IBD CLINIC  for your care. Our goal is always to provide you with excellent care. Hearing back from our patients is one way we can continue to improve our services. Please take a few minutes to complete the written survey that you may receive in the mail after your visit with us. Thank you!             Your Updated Medication List - Protect others around you: Learn how to safely use, store and throw away your medicines at www.disposemymeds.org.          This list is accurate as of 6/13/18  5:06 PM.  Always use your most recent med list.                   Brand Name Dispense Instructions for use Diagnosis    acetaminophen 325 MG tablet    TYLENOL     Take 325-650 mg by mouth        busPIRone 5 MG tablet    BUSPAR    90 tablet    Take 1-2 tablets (5-10 mg) by mouth 3 times daily    Abdominal pain, epigastric       * Dapsone 5 % Gel           * ACZONE 7.5 % gel   Generic drug:  dapsone      APPLY A PEA SIZED AMOUNT TO FULL FACE AND THIN LAYER TO CHEST QD FOR ACNE        * hyoscyamine  0.125 MG sublingual tablet    LEVSIN/SL    60 tablet    Place 1 tablet (0.125 mg) under the tongue every 4 hours as needed for cramping    Abdominal cramping, Constipation, unspecified constipation type       * hyoscyamine 0.125 MG tablet    ANASPAZ/LEVSIN    40 tablet    Take 1-2 tablets (125-250 mcg) by mouth 2 times daily as needed for cramping    Abdominal pain, generalized       loperamide 2 MG capsule    IMODIUM     2 mg        norethindrone-ethinyl estradiol 1-20 MG-MCG per tablet    JUNEL FE 1/20     Take 1 tablet by mouth daily        ondansetron 4 MG ODT tab    ZOFRAN-ODT     DIS ONE T PO Q 8 H PRN N        spironolactone 100 MG tablet    ALDACTONE     Take 100 mg by mouth 2 times daily        tazarotene 0.05 % Crea cream           vitamin D 03412 UNIT capsule    ERGOCALCIFEROL     TK 1 C PO ONCE WEEKLY        * Notice:  This list has 4 medication(s) that are the same as other medications prescribed for you. Read the directions carefully, and ask your doctor or other care provider to review them with you.

## 2018-06-27 ASSESSMENT — ANXIETY QUESTIONNAIRES
6. BECOMING EASILY ANNOYED OR IRRITABLE: SEVERAL DAYS
GAD7 TOTAL SCORE: 9
GAD7 TOTAL SCORE: 9
4. TROUBLE RELAXING: SEVERAL DAYS
7. FEELING AFRAID AS IF SOMETHING AWFUL MIGHT HAPPEN: SEVERAL DAYS
2. NOT BEING ABLE TO STOP OR CONTROL WORRYING: MORE THAN HALF THE DAYS
5. BEING SO RESTLESS THAT IT IS HARD TO SIT STILL: NOT AT ALL
7. FEELING AFRAID AS IF SOMETHING AWFUL MIGHT HAPPEN: SEVERAL DAYS
GAD7 TOTAL SCORE: 9
1. FEELING NERVOUS, ANXIOUS, OR ON EDGE: MORE THAN HALF THE DAYS
3. WORRYING TOO MUCH ABOUT DIFFERENT THINGS: MORE THAN HALF THE DAYS

## 2018-06-27 ASSESSMENT — PATIENT HEALTH QUESTIONNAIRE - PHQ9
SUM OF ALL RESPONSES TO PHQ QUESTIONS 1-9: 3
10. IF YOU CHECKED OFF ANY PROBLEMS, HOW DIFFICULT HAVE THESE PROBLEMS MADE IT FOR YOU TO DO YOUR WORK, TAKE CARE OF THINGS AT HOME, OR GET ALONG WITH OTHER PEOPLE: NOT DIFFICULT AT ALL
SUM OF ALL RESPONSES TO PHQ QUESTIONS 1-9: 3

## 2018-06-28 ASSESSMENT — ANXIETY QUESTIONNAIRES: GAD7 TOTAL SCORE: 9

## 2018-06-28 ASSESSMENT — PATIENT HEALTH QUESTIONNAIRE - PHQ9: SUM OF ALL RESPONSES TO PHQ QUESTIONS 1-9: 3

## 2018-06-29 ENCOUNTER — OFFICE VISIT (OUTPATIENT)
Dept: GASTROENTEROLOGY | Facility: CLINIC | Age: 33
End: 2018-06-29
Payer: COMMERCIAL

## 2018-06-29 DIAGNOSIS — F41.9 ANXIETY: Primary | ICD-10-CM

## 2018-06-29 NOTE — PROGRESS NOTES
"  Health Psychology                  Clinic    Department of Medicine  Zoë Rodríguez, PhD, LP [phone]                          Clinics and Surgery Center  [facility] Mehran Morton, PhD, LP [phone]                  3rd Floor  [place] Mail Code 741   Jerson Suresh, PhD, ABPP, LP [phone]     [address] [facility],   [address] [facility],  Reva Nogueira,  PhD, LP [phone]            [place] [address] Aliyah Ledezma, PhD, LP [phone]     Confidential Summary of Standard Psychodiagnostic Evaluation*    Referral Source:  Melina Esparza MD    Reason for Referral:  Anxiety related to GI illness    Sources of Information:  Information was obtained from a clinical interview with the patient, review of the [facility] medical record, and administration of psychological assessments.     History of Presenting Concerns:  Reva Garza is a 32 year old female with past GI diagnoses of IBS, chronic functional abdominal pain, GERD, and idiopathic constipation presenting for psychological evaluation related to impact of anxiety on GI symptoms.  Regarding history of GI problems, she stated that as a child she frequently had stomach aches and a pediatrician or family doctor told her and her family it was due to the high degree of worry that she had as a child.  However GI symptoms first significantly impacted daily activities during her first year of college.  This was when she began treatment with a gastroenterologist, first participating in extensive diagnostic workup at [facility].  She reported \"bouncing around\" to various clinics, last seen in the gastroenterology clinic at the [facility] from [id number] by MINDY Brown CNP and by Melina Choudhary MD on 6/13/2018.     Currently, she reported a near constant stomachache with fluctuating constipation and diarrhea.  She yesenia with these symptoms " by using medication such as Imodium, which she reported starts in unhelpful cycle of increasing constipation, following which she uses MiraLAX, which results in loose stools.  She previously reported trying BuSpar, but discontinued due to disliking side effects of increased heart rate and nausea.  She previously tried Linzess, which she stated resulted in severe diarrhea.  She reported considering food changes, stating that she has attempted to cut out dairy and coffee but finds it difficult to do so because she likes these foods.  She also reported trying probiotics, which she believed was contributing to increased diarrhea.    She reported that the impact of GI symptoms is significant anxiety about fecal incontinence, which results in avoiding situations in which she does not have easy access to a restroom.  She also reported significant worry during her daily commute, which can be 45 minutes in the morning to 90 minutes in the afternoon.  She worries about having an episode of fecal incontinence and reported constant worrying about what would happen if she did not make it to the bathroom, if she will have access to a bathroom, which she would do with her 3-year-old who is often with her in the car if she had to stop and use the restroom.  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  She reported symptoms have historically flared around stressful times in life such as following her sister's unexpected death approximately 10 years ago during law school, her father's health issues, and discord in her marriage.  Currently she reported that tension in her marriage and her father's recent health issues have been significant stressors.    She also reported that she has significantly limited her ability to go on vacations or take longer outings  outside of her home.  She reported that her symptoms have resulted in worsening of mood, frustration with herself for her ongoing GI problems, and increased irritability.  She reported limited understanding by her ; however she reported that her dad and brother have similar GI complaints and thus she feels understood by these family members when she talks about the impacts of her GI symptoms.    Medical History:    Past Medical History:   Diagnosis Date     Corpus luteum cyst or hematoma 1/2004    ruptured with ER eval and resolution, had f/u with GYN physician Dr. Arroyo.        Past Surgical History:   Procedure Laterality Date     APPENDECTOMY  7/2011     BREAST SURGERY         Current Outpatient Prescriptions   Medication     acetaminophen (TYLENOL) 325 MG tablet     ACZONE 7.5 % gel     busPIRone (BUSPAR) 5 MG tablet     Dapsone 5 % GEL     hyoscyamine (ANASPAZ/LEVSIN) 0.125 MG tablet     hyoscyamine (LEVSIN/SL) 0.125 MG SL tablet     loperamide (IMODIUM) 2 MG capsule     norethindrone-ethinyl estradiol (JUNEL FE 1/20) 1-20 MG-MCG per tablet     ondansetron (ZOFRAN-ODT) 4 MG ODT tab     spironolactone (ALDACTONE) 100 MG tablet     tazarotene 0.05 % CREA cream     vitamin D (ERGOCALCIFEROL) 69690 UNIT capsule     No current facility-administered medications for this visit.        Psychiatric History:  She denied a history of previous diagnosis or treatment for a mood or anxiety disorder or other psychiatric illness.  She reported strong history of depression in both her maternal and paternal family.  She reported a period of depressed mood and bereavement following her sister's unexpected death approximately 10 years ago and acknowledged that this issue may be unresolved in both of her parents.  She and her parents attended 1 supportive counseling session following her sister's death.     Substance Use History:  She denied current or past history of substance misuse.  She reported minimal use of alcohol,  no use of recreational drugs or tobacco products, and drinking 1 caffeinated beverage per day.     Social History:  She currently lives at home with her  of 4 years and 3-year-old son in [facility].  She reported discord in her marriage, which is a stressor.  Her father was hospitalized in the spring for numerous health conditions, and was recently diagnosed with dementia.  She visits her family's home frequently in order to provide caregiving and support to her parents, which she stated is also stressful.  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  Highest level of education is a law degree.    Psychological Assessment:  The patient completed the following battery of assessments during this psychological evaluation: World Health Organization Disability Assessment Schedule 2.0 12-item (WHODAS), Patient Health Questionnaire-9 (PHQ-9), Generalized Anxiety Disorder-7 screener (NARENDRA-7), and the CAGE Questionnaire Adapted to Include Drugs (CAGE-AID).    PHQ-9 SCORE 6/27/2018   Total Score MyChart 3 (Minimal depression)   Total Score 3       NARENDRA-7 SCORE 6/27/2018   Total Score 9 (mild anxiety)   Total Score 9       WHODAS 2.0 TOTAL SCORES 6/29/2018   Total Score 19       Mental Status Examination:  Appearance/Behavior/Orientation: Patient was on time, appropriately groomed and dressed, and demonstrated good eye contact. Alert and oriented to person, place, time, and situation. No evidence of psychomotor agitation.     Cooperation/Reliability: Patient was open and cooperative throughout the interview.    Speech/Language: Speech was clear, coherent, and of normal rate, rhythm and volume.    Thought Form: Overall logical and organized.   Thought Content: Appropriate to interview and situation (e.g., appropriately focused on health and weight).  Perception: Patient denied any difficulties with a thought disorder, including auditory or visual hallucinations.    Cognition/Memory: Not formally assessed, but no difficulties apparent upon interview.   Attention/Concentration: Good throughout interview.    Fund of knowledge: Consistent with age and level of education.    Abstract reasoning: Not assessed.   Judgment: Intact.    Mood/Affect: Mood euthymic; appropriate range of affect.    Insight/Motivation: Good, good  Suicide/Assault: Patient denies suicidal or assaultive ideation, plan, or intent    Impression:  Reva Garza is a 32 year old with functional GI illness and co-occurring anxiety.  She reports the exacerbation of GI illness during stress.  She reported significant limitations in social functioning, mood, and anxiety due to GI illness.  She also reported a tendency to worry and catastrophize the experience of GI symptoms. It appears that she would benefit from cognitive behavioral interventions to improve her ability to manage GI symptoms and anxiety.    Diagnosis:  Anxiety, unspecified    Recommendation/Plan:  Provided psychoeducation about the rationale and course of care for cognitive behavioral therapy for anxiety and dysfunctional GI illness.  Provided psychoeducation about relaxation training, including diaphragmatic breathing as a relaxation training exercise.  Provided recommendation for bibliotherapy CBT for IBS.  She agreed with treatment recommendations.     Reva Nogueira, PhD,   Clinical Health Psychologist    *In accordance with the Rules of the [place] [employer], it is noted that psychological descriptions and scientific procedures underlying psychological evaluations have limitations.  Absolute predictions cannot be made based on information in this report.

## 2018-06-29 NOTE — MR AVS SNAPSHOT
After Visit Summary   6/29/2018    Reva Garza    MRN: [id number]           Patient Information     Date Of Birth          1985        Visit Information        Provider Department      6/29/2018 10:00 AM Reva Nogueira, PhD [facility]        Today's Diagnoses     Anxiety    -  1       Follow-ups after your visit        Your next 10 appointments already scheduled     Jul 20, 2018  4:00 PM CDT   (Arrive by 3:45 PM)   Return Visit with Reva Nogueira,     [facility] ([facility])    [address] [place] [address]   [phone]            Aug 10, 2018  4:00 PM CDT   (Arrive by 3:45 PM)   Return Visit with Reva Nogueira,    [facility] ([facility])    [address] [place] [id number] [phone]            Aug 24, 2018  2:00 PM CDT   (Arrive by 1:45 PM)   Return Visit with Reva Nogueira, PhD   [facility] ([facility])    [address] [place] [address]   [phone]            Sep 07, 2018  3:00 PM CDT   (Arrive by 2:45 PM)   Return Visit with Reva Nogueira, PhD   [facility] ([facility])    [address] [place] [id number] [phone]              Who to contact     Please call your clinic at [phone] to:    Ask questions about your health    Make or cancel appointments    Discuss your medicines    Learn about your test results    Speak to your doctor            Additional Information About Your Visit        MyChart Information     [employer] gives you secure access to your electronic health record. If you see a primary care provider, you can also send messages to your care team and make appointments. If you have questions,  please call your primary care clinic.  If you do not have a primary care provider, please call [phone] and they will assist you.      [employer] is an electronic gateway that provides easy, online access to your medical records. With [employer], you can request a clinic appointment, read your test results, renew a prescription or communicate with your care team.     To access your existing account, please contact your [facility] or call [phone] for assistance.        Care EveryWhere ID     This is your Care EveryWhere ID. This could be used by other organizations to access your [place] medical records  [id number]         Blood Pressure from Last 3 Encounters:   06/13/18 (!) 127/97   12/12/16 143/90   10/12/16 125/88    Weight from Last 3 Encounters:   06/13/18 64.2 kg (141 lb 8 oz)   12/12/16 61.9 kg (136 lb 6.4 oz)   10/12/16 61.5 kg (135 lb 9.6 oz)              Today, you had the following     No orders found for display       Primary Care Provider Office Phone # Fax #    [facility] [phone] [address] [facility] [address]        Equal Access to Services     KARINA SUTTON : Hadii kurt ku hadasho Soomaali, waaxda luqadaha, qaybta kaalmada adeegyada, kanu zhong. So [facility] [phone].    ATENCIÓN: Si habla español, tiene a schulte disposición servicios gratuitos de asistencia lingüística. Llame al [phone].    We comply with applicable federal civil rights laws and [place] laws. We do not discriminate on the basis of race, color, national origin, age, disability, sex, sexual orientation, or gender identity.            Thank you!     Thank you for choosing [facility]  for your care. Our goal is always to provide you with excellent care. Hearing back from our patients is one way we can continue to improve our services. Please take a few minutes to complete the written survey that  you may receive in the mail after your visit with us. Thank you!             Your Updated Medication List - Protect others around you: Learn how to safely use, store and throw away your medicines at www.disposemymeds.org.          This list is accurate as of 6/29/18 11:59 PM.  Always use your most recent med list.                   Brand Name Dispense Instructions for use Diagnosis    acetaminophen 325 MG tablet    TYLENOL     Take 325-650 mg by mouth        busPIRone 5 MG tablet    BUSPAR    90 tablet    Take 1-2 tablets (5-10 mg) by mouth 3 times daily    Abdominal pain, epigastric       * Dapsone 5 % Gel           * ACZONE 7.5 % gel   Generic drug:  dapsone      APPLY A PEA SIZED AMOUNT TO FULL FACE AND THIN LAYER TO CHEST QD FOR ACNE        * hyoscyamine 0.125 MG sublingual tablet    LEVSIN/SL    60 tablet    Place 1 tablet (0.125 mg) under the tongue every 4 hours as needed for cramping    Abdominal cramping, Constipation, unspecified constipation type       * hyoscyamine 0.125 MG tablet    ANASPAZ/LEVSIN    40 tablet    Take 1-2 tablets (125-250 mcg) by mouth 2 times daily as needed for cramping    Abdominal pain, generalized       loperamide 2 MG capsule    IMODIUM     2 mg        norethindrone-ethinyl estradiol 1-20 MG-MCG per tablet    JUNEL FE 1/20     Take 1 tablet by mouth daily        ondansetron 4 MG ODT tab    ZOFRAN-ODT     DIS ONE T PO Q 8 H PRN N        spironolactone 100 MG tablet    ALDACTONE     Take 100 mg by mouth 2 times daily        tazarotene 0.05 % Crea cream           vitamin D 19343 UNIT capsule    ERGOCALCIFEROL     TK 1 C PO ONCE WEEKLY        * Notice:  This list has 4 medication(s) that are the same as other medications prescribed for you. Read the directions carefully, and ask your doctor or other care provider to review them with you.

## 2018-06-29 NOTE — LETTER
"6/29/2018       RE: Reva Garza  [address] [address] [facility] [address]     Dear Colleague,    Thank you for referring your patient, Reva Garza, to the [facility] GASTRO[facility]OLOG[facility] IBD CLINIC at [facility]. Please see a copy of my visit note below.      Health Psychology                  Clinic    Department of Medicine  Zoë Rodríguez, PhD, LP [phone]                          Clinics and Surgery Center  [facility] Mehran Morton, PhD, LP [phone]                  3rd Floor  [place] Mail Code 741   Jerson Suresh, PhD, ABPP, LP [phone]     [address] [facility],   [address] [facility],  Reva Nogueira,  PhD, LP [phone]            [place]  [address]  [place] [address] Aliyah Ledezma, PhD, LP [phone]     Confidential Summary of Standard Psychodiagnostic Evaluation*    Referral Source:  Melina Esparza MD    Reason for Referral:  Anxiety related to GI illness    Sources of Information:  Information was obtained from a clinical interview with the patient, review of the [facility] medical record, and administration of psychological assessments.     History of Presenting Concerns:  Reva Garza is a 32 year old female with past GI diagnoses of IBS, chronic functional abdominal pain, GERD, and idiopathic constipation presenting for psychological evaluation related to impact of anxiety on GI symptoms.  Regarding history of GI problems, she stated that as a child she frequently had stomach aches and a pediatrician or family doctor told her and her family it was due to the high degree of worry that she had as a child.  However GI symptoms first significantly impacted daily activities during her first year of college.  This was when she began treatment with a gastroenterologist, first participating in extensive diagnostic workup at [facility].  She reported \"bouncing " "around\" to various clinics, last seen in the gastroenterology clinic at the [facility] from [id number] by MINDY Brown CNP and by Melina Choudhary MD on 6/13/2018.     Currently, she reported a near constant stomachache with fluctuating constipation and diarrhea.  She yesenia with these symptoms by using medication such as Imodium, which she reported starts in unhelpful cycle of increasing constipation, following which she uses MiraLAX, which results in loose stools.  She previously reported trying BuSpar, but discontinued due to disliking side effects of increased heart rate and nausea.  She previously tried Linzess, which she stated resulted in severe diarrhea.  She reported considering food changes, stating that she has attempted to cut out dairy and coffee but finds it difficult to do so because she likes these foods.  She also reported trying probiotics, which she believed was contributing to increased diarrhea.    She reported that the impact of GI symptoms is significant anxiety about fecal incontinence, which results in avoiding situations in which she does not have easy access to a restroom.  She also reported significant worry during her daily commute, which can be 45 minutes in the morning to 90 minutes in the afternoon.  She worries about having an episode of fecal incontinence and reported constant worrying about what would happen if she did not make it to the bathroom, if she will have access to a bathroom, which she would do with her 3-year-old who is often with her in the car if she had to stop and use the restroom.  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  She reported symptoms have historically flared around stressful times in life such as following her sister's unexpected death approximately 10 years " ago during law school, her father's health issues, and discord in her marriage.  Currently she reported that tension in her marriage and her father's recent health issues have been significant stressors.    She also reported that she has significantly limited her ability to go on vacations or take longer outings outside of her home.  She reported that her symptoms have resulted in worsening of mood, frustration with herself for her ongoing GI problems, and increased irritability.  She reported limited understanding by her ; however she reported that her dad and brother have similar GI complaints and thus she feels understood by these family members when she talks about the impacts of her GI symptoms.    Medical History:    Past Medical History:   Diagnosis Date     Corpus luteum cyst or hematoma 1/2004    ruptured with ER eval and resolution, had f/u with GYN physician Dr. Arroyo.        Past Surgical History:   Procedure Laterality Date     APPENDECTOMY  7/2011     BREAST SURGERY         Current Outpatient Prescriptions   Medication     acetaminophen (TYLENOL) 325 MG tablet     ACZONE 7.5 % gel     busPIRone (BUSPAR) 5 MG tablet     Dapsone 5 % GEL     hyoscyamine (ANASPAZ/LEVSIN) 0.125 MG tablet     hyoscyamine (LEVSIN/SL) 0.125 MG SL tablet     loperamide (IMODIUM) 2 MG capsule     norethindrone-ethinyl estradiol (JUNEL FE 1/20) 1-20 MG-MCG per tablet     ondansetron (ZOFRAN-ODT) 4 MG ODT tab     spironolactone (ALDACTONE) 100 MG tablet     tazarotene 0.05 % CREA cream     vitamin D (ERGOCALCIFEROL) 09526 UNIT capsule     No current facility-administered medications for this visit.        Psychiatric History:  She denied a history of previous diagnosis or treatment for a mood or anxiety disorder or other psychiatric illness.  She reported strong history of depression in both her maternal and paternal family.  She reported a period of depressed mood and bereavement following her sister's unexpected death  approximately 10 years ago and acknowledged that this issue may be unresolved in both of her parents.  She and her parents attended 1 supportive counseling session following her sister's death.     Substance Use History:  She denied current or past history of substance misuse.  She reported minimal use of alcohol, no use of recreational drugs or tobacco products, and drinking 1 caffeinated beverage per day.     Social History:  She currently lives at home with her  of 4 years and 3-year-old son in [facility] [facility] [place] [facility].  She reported discord in her marriage, which is a stressor.  Her father was hospitalized in the spring for numerous health conditions, and was recently diagnosed with dementia.  She visits her family's home frequently in order to provide caregiving and support to her parents, which she stated is also stressful.  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  Highest level of education is a law degree.    Psychological Assessment:  The patient completed the following battery of assessments during this psychological evaluation: World Health Organization Disability Assessment Schedule 2.0 12-item (WHODAS), Patient Health Questionnaire-9 (PHQ-9), Generalized Anxiety Disorder-7 screener (NARENDRA-7), and the CAGE Questionnaire Adapted to Include Drugs (CAGE-AID).    PHQ-9 SCORE 6/27/2018   Total Score MyChart 3 (Minimal depression)   Total Score 3       NARENDRA-7 SCORE 6/27/2018   Total Score 9 (mild anxiety)   Total Score 9       WHODAS 2.0 TOTAL SCORES 6/29/2018   Total Score 19       Mental Status Examination:  Appearance/Behavior/Orientation: Patient was on time, appropriately groomed and dressed, and demonstrated good eye contact. Alert and oriented to person, place, time, and situation. No evidence of psychomotor agitation.     Cooperation/Reliability: Patient was open and cooperative throughout the interview.    Speech/Language: Speech was clear, coherent, and of normal  rate, rhythm and volume.    Thought Form: Overall logical and organized.   Thought Content: Appropriate to interview and situation (e.g., appropriately focused on health and weight).  Perception: Patient denied any difficulties with a thought disorder, including auditory or visual hallucinations.   Cognition/Memory: Not formally assessed, but no difficulties apparent upon interview.   Attention/Concentration: Good throughout interview.    Fund of knowledge: Consistent with age and level of education.    Abstract reasoning: Not assessed.   Judgment: Intact.    Mood/Affect: Mood euthymic; appropriate range of affect.    Insight/Motivation: Good, good  Suicide/Assault: Patient denies suicidal or assaultive ideation, plan, or intent    Impression:  Reva Garza is a 32 year old with functional GI illness and co-occurring anxiety.  She reports the exacerbation of GI illness during stress.  She reported significant limitations in social functioning, mood, and anxiety due to GI illness.  She also reported a tendency to worry and catastrophize the experience of GI symptoms. It appears that she would benefit from cognitive behavioral interventions to improve her ability to manage GI symptoms and anxiety.    Diagnosis:  Anxiety, unspecified    Recommendation/Plan:  Provided psychoeducation about the rationale and course of care for cognitive behavioral therapy for anxiety and dysfunctional GI illness.  Provided psychoeducation about relaxation training, including diaphragmatic breathing as a relaxation training exercise.  Provided recommendation for bibliotherapy CBT for IBS.  She agreed with treatment recommendations.     Reva Nogueira, PhD,   Clinical Health Psychologist    *In accordance with the Rules of the [place] Board [employer], it is noted that psychological descriptions and scientific procedures underlying psychological evaluations have limitations.  Absolute predictions cannot be made based on  information in this report.

## 2018-11-14 ENCOUNTER — OFFICE VISIT (OUTPATIENT)
Dept: INTERNAL MEDICINE | Facility: CLINIC | Age: 33
End: 2018-11-14
Payer: COMMERCIAL

## 2018-11-14 ENCOUNTER — TELEPHONE (OUTPATIENT)
Dept: INTERNAL MEDICINE | Facility: CLINIC | Age: 33
End: 2018-11-14

## 2018-11-14 VITALS
HEART RATE: 88 BPM | RESPIRATION RATE: 16 BRPM | SYSTOLIC BLOOD PRESSURE: 114 MMHG | TEMPERATURE: 98.4 F | OXYGEN SATURATION: 98 % | WEIGHT: 147.6 LBS | DIASTOLIC BLOOD PRESSURE: 82 MMHG | BODY MASS INDEX: 23.82 KG/M2

## 2018-11-14 DIAGNOSIS — R10.13 EPIGASTRIC PAIN: Primary | ICD-10-CM

## 2018-11-14 DIAGNOSIS — K21.9 GASTROESOPHAGEAL REFLUX DISEASE, ESOPHAGITIS PRESENCE NOT SPECIFIED: Primary | ICD-10-CM

## 2018-11-14 PROCEDURE — 99214 OFFICE O/P EST MOD 30 MIN: CPT | Performed by: INTERNAL MEDICINE

## 2018-11-14 RX ORDER — FAMOTIDINE 20 MG/1
20 TABLET, FILM COATED ORAL 2 TIMES DAILY
Qty: 60 TABLET | Refills: 0 | Status: SHIPPED | OUTPATIENT
Start: 2018-11-14

## 2018-11-14 RX ORDER — OMEPRAZOLE 40 MG/1
40 CAPSULE, DELAYED RELEASE ORAL DAILY
Qty: 90 CAPSULE | Refills: 0 | Status: SHIPPED | OUTPATIENT
Start: 2018-11-14 | End: 2019-03-28

## 2018-11-14 NOTE — TELEPHONE ENCOUNTER
Yes - please submit a PA.    Patient's symptoms occurred while she was on omeprazole 20mg daily (OTC) indicating that a higher dose is necessary.

## 2018-11-14 NOTE — PATIENT INSTRUCTIONS
STOP current omeprazole.    START omeprazole 40mg daily x 2-3 months (then can go back to 20mg daily).    RECOMMEND Pepcid twice a day for next 2-4 weeks then as needed for immediate relief of symptoms (omeprazole is sllooooooowwww- acting).

## 2018-11-14 NOTE — PROGRESS NOTES
SUBJECTIVE:                                                      HPI: Reva Garza is a pleasant 33 year old female who presents with abdominal pain:    - ongoing for the last week  - epigastric in location  - present all the time  - describes as a dull ache  - fluctuates in intensity-generally improves with eating  - associated nausea, no vomiting    - no diarrhea, melena, hematochezia  - no fevers or chills  - no anorexia, unintentional weight loss, or night sweats    EGD, including biopsies, in 2015 was unremarkable.    Currently taking omeprazole 20 mg daily.    PSH significant for appendectomy.    The medication, allergy, and problem lists have been reviewed and updated as appropriate.       OBJECTIVE:                                                      /82  Pulse 88  Temp 98.4  F (36.9  C) (Oral)  Resp 16  Wt 147 lb 9.6 oz (67 kg)  LMP  (LMP Unknown)  SpO2 98%  BMI 23.82 kg/m2  Constitutional: well-appearing  Respiratory: normal respiratory effort; clear to auscultation bilaterally  Cardiovascular: regular rate and rhythm; no edema  Gastrointestinal: soft, non-tender, non-distended, and bowel sounds present; no organomegaly or masses   Psych: normal judgment and insight; normal mood and affect; recent and remote memory intact      ASSESSMENT/PLAN:                                                      (R10.13) Epigastric pain  (primary encounter diagnosis)  Comment: suspect gastritis.  Plan:    - INCREASE omeprazole to 40 mg daily x 2-3 months; can resume 20mg daily after that.   - Pepcid bid x 2-4 weeks then as needed for immediate relief of symptoms.   - if symptoms worsen, change, do not improve, patient contact MD.    The instructions on the AVS were discussed and explained to the patient. Patient expressed understanding of instructions.    (Chart documentation was completed, in part, with [employer] voice-recognition software. Even though reviewed, some grammatical, spelling, and word  errors may remain.)    Mei Dimas MD   [address] [address]  T: [phone], F: [phone]

## 2018-11-14 NOTE — TELEPHONE ENCOUNTER
Prior Authorization Retail Medication Request    Medication/Dose: omeprazole (PRILOSEC) 40 MG capsule  ICD code (if different than what is on RX):  R10.13  Previously Tried and Failed:    Rationale:      Insurance Name:  [facility]  Insurance ID:  [id number]      Pharmacy Information (if different than what is on RX)  Name:  [facility]  Phone:  [phone]

## 2018-11-14 NOTE — MR AVS SNAPSHOT
After Visit Summary   11/14/2018    Reva Garza    MRN: [id number]           Patient Information     Date Of Birth          1985        Visit Information        Provider Department      11/14/2018 9:45 AM Mei Dimas MD [facility]        Today's Diagnoses     Epigastric pain    -  1      Care Instructions    STOP current omeprazole.    START omeprazole 40mg daily x 2-3 months (then can go back to 20mg daily).    RECOMMEND Pepcid twice a day for next 2-4 weeks then as needed for immediate relief of symptoms (omeprazole is sllooooooowwww- acting).           Follow-ups after your visit        Who to contact     If you have questions or need follow up information about today's clinic visit or your schedule please contact [facility] directly at [phone].  Normal or non-critical lab and imaging results will be communicated to you by MyChart, letter or phone within 4 business days after the clinic has received the results. If you do not hear from us within 7 days, please contact the clinic through [employer] or phone. If you have a critical or abnormal lab result, we will notify you by phone as soon as possible.  Submit refill requests through [employer] or call your pharmacy and they will forward the refill request to us. Please allow 3 business days for your refill to be completed.          Additional Information About Your Visit        MyChart Information     [employer] gives you secure access to your electronic health record. If you see a primary care provider, you can also send messages to your care team and make appointments. If you have questions, please call your primary care clinic.  If you do not have a primary care provider, please call [phone] and they will assist you.        Care EveryWhere ID     This is your Care EveryWhere ID. This could be used by other organizations to access your [facility] medical records  [id number]         Your Vitals Were     Pulse Temperature Respirations Last Period Pulse Oximetry BMI (Body Mass Index)    88 98.4  F (36.9  C) (Oral) 16 (LMP Unknown) 98% 23.82 kg/m2       Blood Pressure from Last 3 Encounters:   11/14/18 114/82   06/13/18 (!) 127/97   12/12/16 143/90    Weight from Last 3 Encounters:   11/14/18 147 lb 9.6 oz (67 kg)   06/13/18 141 lb 8 oz (64.2 kg)   12/12/16 136 lb 6.4 oz (61.9 kg)              Today, you had the following     No orders found for display         Today's Medication Changes          These changes are accurate as of 11/14/18 10:02 AM.  If you have any questions, ask your nurse or doctor.               Start taking these medicines.        Dose/Directions    famotidine 20 MG tablet   Commonly known as:  PEPCID   Used for:  Epigastric pain   Started by:  Mei Dimas MD        Dose:  20 mg   Take 1 tablet (20 mg) by mouth 2 times daily   Quantity:  60 tablet   Refills:  0         These medicines have changed or have updated prescriptions.        Dose/Directions    * OMEPRAZOLE PO   This may have changed:  Another medication with the same name was added. Make sure you understand how and when to take each.   Changed by:  Mei Dimas MD        Take by mouth every morning   Refills:  0       * omeprazole 40 MG capsule   Commonly known as:  priLOSEC   This may have changed:  You were already taking a medication with the same name, and this prescription was added. Make sure you understand how and when to take each.   Used for:  Epigastric pain   Changed by:  Mei Dimas MD        Dose:  40 mg   Take 1 capsule (40 mg) by mouth daily Take 30-60 minutes before a meal.   Quantity:  90 capsule   Refills:  0       * Notice:  This list has 2 medication(s) that are the same as other medications prescribed for you. Read the directions carefully, and ask your doctor or other care provider to review them with you.         Where to get your medicines      These medications were  sent to [id number] - [place] - [address] AT [facility] [address] [place]  [address], [place] [address]     Phone:  [phone]     famotidine 20 MG tablet    omeprazole 40 MG capsule                Primary Care Provider Office Phone # Fax #    [facility] [phone] [address] [address] [address]        Equal Access to Services     [facility] LESLEY : Hadii aad ku hadasho Soomaali, waaxda luqadaha, qaybta kaalmada adeegyada, waxay idiin hayaan adeeg kharash la'aan . So [facility] [phone].    ATENCIÓN: Si habla español, tiene a schulte disposición servicios gratuitos de asistencia lingüística. [facility] [phone].    We comply with applicable federal civil rights laws and [place] laws. We do not discriminate on the basis of race, color, national origin, age, disability, sex, sexual orientation, or gender identity.            Thank you!     Thank you for choosing [facility]  for your care. Our goal is always to provide you with excellent care. Hearing back from our patients is one way we can continue to improve our services. Please take a few minutes to complete the written survey that you may receive in the mail after your visit with us. Thank you!             Your Updated Medication List - Protect others around you: Learn how to safely use, store and throw away your medicines at www.disposemymeds.org.          This list is accurate as of 11/14/18 10:02 AM.  Always use your most recent med list.                   Brand Name Dispense Instructions for use Diagnosis    Dapsone 5 % Gel           famotidine 20 MG tablet    PEPCID    60 tablet    Take 1 tablet (20 mg) by mouth 2 times daily    Epigastric pain       hyoscyamine 0.125 MG tablet    ANASPAZ/LEVSIN    40 tablet    Take 1-2 tablets (125-250 mcg) by mouth 2 times daily as needed for cramping    Abdominal pain, generalized       loperamide 2 MG  capsule    IMODIUM     2 mg        norethindrone-ethinyl estradiol 1-20 MG-MCG per tablet    JUNEL FE 1/20     Take 1 tablet by mouth daily        * OMEPRAZOLE PO      Take by mouth every morning        * omeprazole 40 MG capsule    priLOSEC    90 capsule    Take 1 capsule (40 mg) by mouth daily Take 30-60 minutes before a meal.    Epigastric pain       spironolactone 100 MG tablet    ALDACTONE     Take 100 mg by mouth 2 times daily        vitamin D2 38799 UNIT capsule    ERGOCALCIFEROL     TK 1 C PO ONCE WEEKLY        * Notice:  This list has 2 medication(s) that are the same as other medications prescribed for you. Read the directions carefully, and ask your doctor or other care provider to review them with you.

## 2018-11-14 NOTE — TELEPHONE ENCOUNTER
PCP please advise if you would like to submit PA.     Per last office visit note, looks like patient had tried 20mg once daily with no relief of current symptoms.     Please provide rationale if appropriate or inform nursing to discuss with patient if willing to purchase over the counter.     Rubia BULLOCK RN, BSN, PHN

## 2018-11-15 NOTE — TELEPHONE ENCOUNTER
Central Prior Authorization Team   Phone: [phone]      PA Initiation    Medication: omeprazole (PRILOSEC) 40 MG capsule-Initiated  Insurance Company: [employer] - Phone [phone] Fax [phone]  [facility] [address] [place] [address] [address] [facility]  [address] [facility]  Filling Pharmacy Phone: [phone]  Filling Pharmacy Fax:    Start Date: 11/15/2018

## 2018-11-15 NOTE — TELEPHONE ENCOUNTER
PRIOR AUTHORIZATION DENIED    Medication: omeprazole (PRILOSEC) 40 MG capsule-DENIED    Denial Date: 11/15/2018    Denial Rational: Requested product is not covered under the patient's pharmacy benefits.              Appeal Information:

## 2019-03-12 ENCOUNTER — TRANSFERRED RECORDS (OUTPATIENT)
Dept: HEALTH INFORMATION MANAGEMENT | Facility: CLINIC | Age: 34
End: 2019-03-12

## 2019-03-12 LAB
CREAT SERPL-MCNC: 0.87 MG/DL (ref 0.55–1.02)
GFR SERPL CREATININE-BSD FRML MDRD: >60 ML/MIN/1.73M2
GLUCOSE SERPL-MCNC: 87 MG/DL (ref 70–110)
POTASSIUM SERPL-SCNC: 3.3 MMOL/L (ref 3.5–5.1)

## 2019-03-28 ENCOUNTER — OFFICE VISIT (OUTPATIENT)
Dept: GASTROENTEROLOGY | Facility: CLINIC | Age: 34
End: 2019-03-28
Payer: COMMERCIAL

## 2019-03-28 VITALS
HEART RATE: 90 BPM | OXYGEN SATURATION: 97 % | BODY MASS INDEX: 24.12 KG/M2 | DIASTOLIC BLOOD PRESSURE: 89 MMHG | HEIGHT: 66 IN | WEIGHT: 150.1 LBS | SYSTOLIC BLOOD PRESSURE: 128 MMHG

## 2019-03-28 DIAGNOSIS — R11.0 NAUSEA: ICD-10-CM

## 2019-03-28 DIAGNOSIS — K21.9 GASTROESOPHAGEAL REFLUX DISEASE, ESOPHAGITIS PRESENCE NOT SPECIFIED: ICD-10-CM

## 2019-03-28 DIAGNOSIS — R07.89 ATYPICAL CHEST PAIN: Primary | ICD-10-CM

## 2019-03-28 DIAGNOSIS — K58.9 IRRITABLE BOWEL SYNDROME, UNSPECIFIED TYPE: ICD-10-CM

## 2019-03-28 PROCEDURE — 99204 OFFICE O/P NEW MOD 45 MIN: CPT | Performed by: PHYSICIAN ASSISTANT

## 2019-03-28 RX ORDER — PANTOPRAZOLE SODIUM 40 MG/1
40 TABLET, DELAYED RELEASE ORAL EVERY MORNING
Qty: 90 TABLET | Refills: 0 | Status: SHIPPED | OUTPATIENT
Start: 2019-03-28 | End: 2019-06-27

## 2019-03-28 RX ORDER — ONDANSETRON 4 MG/1
4 TABLET, FILM COATED ORAL EVERY 8 HOURS PRN
Qty: 20 TABLET | Refills: 1 | Status: SHIPPED | OUTPATIENT
Start: 2019-03-28

## 2019-03-28 ASSESSMENT — ENCOUNTER SYMPTOMS
FEVER: 0
AGITATION: 0
UNEXPECTED WEIGHT CHANGE: 0
FLANK PAIN: 0
CONSTIPATION: 1
ABDOMINAL PAIN: 0
NERVOUS/ANXIOUS: 0
COUGH: 0
TROUBLE SWALLOWING: 0
VOICE CHANGE: 0
SHORTNESS OF BREATH: 0
DIFFICULTY URINATING: 0
WEAKNESS: 0
NAUSEA: 1
BLOOD IN STOOL: 0
PALPITATIONS: 0
PHOTOPHOBIA: 0
DYSURIA: 0
SORE THROAT: 0
SPEECH DIFFICULTY: 0
FATIGUE: 0
LIGHT-HEADEDNESS: 0
HEMATURIA: 0
VOMITING: 0

## 2019-03-28 ASSESSMENT — MIFFLIN-ST. JEOR: SCORE: 1397.6

## 2019-03-28 ASSESSMENT — PAIN SCALES - GENERAL: PAINLEVEL: NO PAIN (0)

## 2019-03-28 NOTE — PROGRESS NOTES
GASTROENTEROLOGY NEW PATIENT CLINIC VISIT    CC/REFERRING MD:    [facility]      REASON FOR CONSULTATION:   Referred by self for Consult         HISTORY OF PRESENT ILLNESS:    Reva Garza is 33 year old female who presents for evaluation of atypical chest pain, GERD and IBS.      Patient was recently seen at [place] emergency room for evaluation of chest pain.  Chest pain started early in the morning while driving to work.  She continued to have intermittent substernal chest pain that day that she described as a heaviness or tightness sensation. Her symptoms progressed to include left arm tingling sensation. In the ER she was evaluated with labs and EKG. She was noted to have slightly elevated troponin levels.  EKG was negative for any acute ischemic changes but she was noted to be tachycardic.  Her chest x-ray was clear.  She was tested with a CT pulmonary angiogram to rule out PE and this was also found to be negative.  She was admitted for observation for serial troponins and further cardiac workup.  She was evaluated with a stress echo during admission was which was found to be normal.  Her chest pain was thought to be related to esophageal spasm due to significant GERD.    Exhibits daily GERD symptoms.  She is on H2 blocker and PPI which she takes at the same time without regard to food.  She was advised to take PPI on empty stomach about 30-60 minutes before food which is how she is been taken since her admission.  She is taking Pepcid 20 mg at bedtime at this time.  She endorses daily nausea.  No vomiting.  She also exhibits daily acid reflux despite medication.  She denies any difficulty swallowing.  She was previously evaluated with an upper endoscopy in August 2015 which revealed a normal esophagus, normal stomach and normal duodenum.  Biopsies were negative for H. pylori and negative for celiac disease.    She also has a history of IBS and notes regular abdominal  cramping and stomachaches associated with her bowel movements.  She has history of both diarrhea and constipation but is currently constipated and is taking MiraLAX daily which helps improve her symptoms.  There is no blood or black tarry stools.     There is no unintentional weight loss.  There is no family history of colon cancer.        PREVIOUS ENDOSCOPY:    Upper endoscopy in August 2015   -Normal esophagus  -Normal stomach, negative H. pylori biopsy  -Normal duodenum, negative for celiac sprue    [facility]      PROBLEM LIST  Patient Active Problem List    Diagnosis Date Noted     Chronic bilateral low back pain without sciatica 01/22/2018     Priority: Medium     CARDIOVASCULAR SCREENING; LDL GOAL LESS THAN 160 02/10/2010     Priority: Medium     External hemorrhoids 11/06/2005     Priority: Medium     Problem list name updated by automated process. Provider to review       Irritable bowel syndrome 11/06/2005     Priority: Medium       PERTINENT PAST MEDICAL HISTORY:  (I personally reviewed this history with the patient at today's visit)   Past Medical History:   Diagnosis Date     Corpus luteum cyst or hematoma 1/2004    ruptured with ER eval and resolution, had f/u with GYN physician Dr. Arroyo.          PREVIOUS SURGERIES: (I personally reviewed this history with the patient at today's visit)   Past Surgical History:   Procedure Laterality Date     APPENDECTOMY  7/2011     BREAST SURGERY           ALLERGIES:     Allergies   Allergen Reactions     No Known Drug Allergies        PERTINENT MEDICATIONS:    Current Outpatient Medications:      Dapsone 5 % GEL, , Disp: , Rfl:      famotidine (PEPCID) 20 MG tablet, Take 1 tablet (20 mg) by mouth 2 times daily, Disp: 60 tablet, Rfl: 0     hyoscyamine (ANASPAZ/LEVSIN) 0.125 MG tablet, Take 1-2 tablets (125-250 mcg) by mouth 2 times daily as needed for cramping, Disp: 40 tablet, Rfl: 1     loperamide (IMODIUM) 2 MG capsule, 2 mg, Disp: , Rfl:       norethindrone-ethinyl estradiol (JUNEL FE 1/20) 1-20 MG-MCG per tablet, Take 1 tablet by mouth daily, Disp: , Rfl:      omeprazole (PRILOSEC) 20 MG CR capsule, Take 1 capsule (20 mg) by mouth daily, Disp: 90 capsule, Rfl: 3     spironolactone (ALDACTONE) 100 MG tablet, Take 100 mg by mouth 2 times daily, Disp: , Rfl:      omeprazole (PRILOSEC) 40 MG capsule, Take 1 capsule (40 mg) by mouth daily Take 30-60 minutes before a meal. (Patient not taking: Reported on 3/28/2019), Disp: 90 capsule, Rfl: 0     OMEPRAZOLE PO, Take by mouth every morning, Disp: , Rfl:      Vitamin D2, Ergocalciferol, 2000 units CAPS, Take 1 cap PO daily, Disp: , Rfl: 0    SOCIAL HISTORY:  Social History     Socioeconomic History     Marital status:      Spouse name: Not on file     Number of children: 0     Years of education: Not on file     Highest education level: Not on file   Occupational History     Not on file   Social Needs     Financial resource strain: Not on file     Food insecurity:     Worry: Not on file     Inability: Not on file     Transportation needs:     Medical: Not on file     Non-medical: Not on file   Tobacco Use     Smoking status: Never Smoker     Smokeless tobacco: Never Used   Substance and Sexual Activity     Alcohol use: Yes     Alcohol/week: 0.0 oz     Comment: socially     Drug use: No     Sexual activity: Yes     Partners: Male     Birth control/protection: Pill     Comment: BCP- ortho trinesa   Lifestyle     Physical activity:     Days per week: Not on file     Minutes per session: Not on file     Stress: Not on file   Relationships     Social connections:     Talks on phone: Not on file     Gets together: Not on file     Attends Sabianist service: Not on file     Active member of club or organization: Not on file     Attends meetings of clubs or organizations: Not on file     Relationship status: Not on file     Intimate partner violence:     Fear of current or ex partner: Not on file     Emotionally  "abused: Not on file     Physically abused: Not on file     Forced sexual activity: Not on file   Other Topics Concern     Parent/sibling w/ CABG, MI or angioplasty before 65F 55M? Not Asked   Social History Narrative     Not on file       FAMILY HISTORY: (I personally reviewed this history with the patient at today's visit)  Family History   Problem Relation Age of Onset     Hypertension Mother      Lipids Mother      Breast Cancer Mother 61        breast     Depression Mother      Diabetes Father      Hypertension Father      Lipids Father      Irritable Bowel Syndrome Father         and brother     Hypertension Paternal Grandmother      Lipids Paternal Grandmother      Heart Disease Maternal Grandfather      Thyroid Disease Maternal Grandmother      Lipids Paternal Grandfather      Hypertension Paternal Grandfather      Gallbladder Disease Paternal Grandfather         GMa, 2aunts, cousin          Review of Systems   Constitutional: Negative for fatigue, fever and unexpected weight change.   HENT: Negative for sore throat, trouble swallowing and voice change.    Eyes: Negative for photophobia and visual disturbance.   Respiratory: Negative for cough and shortness of breath.    Cardiovascular: Negative for chest pain, palpitations and leg swelling.   Gastrointestinal: Positive for constipation and nausea. Negative for abdominal pain, blood in stool and vomiting.   Genitourinary: Negative for difficulty urinating, dysuria, flank pain and hematuria.   Skin: Negative for pallor and rash.   Neurological: Negative for speech difficulty, weakness and light-headedness.   Psychiatric/Behavioral: Negative for agitation. The patient is not nervous/anxious.        PHYSICAL EXAMINATION:  Constitutional: aaox3, cooperative, pleasant, not dyspneic/diaphoretic, no acute distress  Vitals reviewed: /89 (BP Location: Left arm, Patient Position: Sitting, Cuff Size: Adult Regular)   Pulse 90   Ht 1.676 m (5' 6\")   Wt 68.1 kg " (150 lb 1.6 oz)   SpO2 97%   BMI 24.23 kg/m     Wt:   Wt Readings from Last 2 Encounters:   03/28/19 68.1 kg (150 lb 1.6 oz)   11/14/18 67 kg (147 lb 9.6 oz)        Physical Exam   Constitutional: She is oriented to person, place, and time. She appears well-developed and well-nourished. No distress.   HENT:   Head: Normocephalic and atraumatic.   Nose: Nose normal.   Eyes: Conjunctivae and EOM are normal. Pupils are equal, round, and reactive to light. Right eye exhibits no discharge. Left eye exhibits no discharge. No scleral icterus.   Neck: Normal range of motion.   Cardiovascular: Normal rate, regular rhythm and normal heart sounds.   Pulmonary/Chest: Effort normal and breath sounds normal. No respiratory distress. She has no wheezes.   Abdominal: Soft. Bowel sounds are normal. She exhibits no distension and no mass. There is no tenderness. There is no rebound and no guarding.   Musculoskeletal: Normal range of motion.   Neurological: She is alert and oriented to person, place, and time.   Skin: Skin is warm and dry. She is not diaphoretic. No erythema. No pallor.   Psychiatric: She has a normal mood and affect. Her behavior is normal.   Nursing note and vitals reviewed.      PERTINENT STUDIES: (I personally reviewed these laboratory studies today)  Most recent CBC 3/12/19:   Ref Range & Units Value   WBC 4.3 - 10.8 K/uL 9.9    RBC 4.20 - 5.40 M/uL 4.84    HEMOGLOBIN 12.0 - 16.0 gm/dL 14.8    HEMATOCRIT 36.0 - 48.0 % 42.7    MCV 80 - 100 fl 88    MCH 27 - 33 pg 31    MCHC 33 - 36 gm/dL 35    RDW 11.5 - 14.5 % 12.8    PLATELET COUNT 150 - 400 K/    MPV 6.5 - 12.0  8.6          Most recent hepatic panel 01/9/18:   Ref Range & Units Value   ALT 12 - 68 IU/L 21    ALKALINE P'TASE 45 - 117 IU/L 81    AST (SGOT) 15 - 37 IU/L 16    PROTEIN TOTAL 6.4 - 8.2 g/dL 8.6 Abnormally high     ALBUMIN 3.4 - 5.0 gm/dL 3.9    BILIRUBIN-DIRECT <=0.20 mg/dL 0.05    BILIRUBIN-TOTAL 0.2 - 1.0 mg/dL 0.2          Most recent  creatinine 3/12/19:  CREATININE 0.55 - 1.02 mg/dL 0.87            RADIOLOGY:    Reviewed imaging/studies done during ER visit/admission.         ASSESSMENT/PLAN:    Reva Garza is a 33 year old female who presents for evaluation of atypical chest pain, GERD and IBS.    She was recently admitted to [facility] for chest pain.  Her evaluation included labs, EKG and echocardiogram.  It was determined that her chest pain was atypical and not related to cardiac etiology.  This thought to be related to esophageal spasms and/or reflux.  She does history of reflux and is currently on omeprazole 20 mg and Pepcid 20 mg daily.  Recently she was taking these medications together at any time.  She was advised to take omeprazole in the morning and Pepcid at bedtime and has been doing so since admission.  She exhibits daily reflux symptoms despite medications.  We will switch omeprazole to Protonix 40 mg every morning on an empty stomach.  She may continue Pepcid at bedtime. Patient advised to avoid NSAIDs. rx of zofran as needed for nausea. Recommended further evaluation with an upper endoscopy.  Further recommendations thereafter.  May need to consider manometry and/or pH studies if upper endoscopy unremarkable.        Atypical chest pain  Gastroesophageal reflux disease, esophagitis presence not specified  - pantoprazole (PROTONIX) 40 MG EC tablet  Dispense: 90 tablet; Refill: 0  Irritable bowel syndrome, unspecified type  Nausea  - ondansetron (ZOFRAN) 4 MG tablet  Dispense: 20 tablet; Refill: 1    Orders Placed This Encounter   Procedures     GASTROENTEROLOGY ADULT REF PROCEDURE ONLY [facility] [phone]; (Dr. Garcia)           RTC 6-8 weeks    Thank you for this consultation.  It was a pleasure to participate in the care of this patient; please contact us with any further questions.      This note was created with voice recognition software, and while reviewed for accuracy, typos may remain.      Davey Mahoney PA-C  Gastroenterology  [facility]

## 2019-03-28 NOTE — PATIENT INSTRUCTIONS
Stop Omeprazole (prilosec)  Start taking Protonix (pantoprazole) 40mg once every morning on an empty stomach about 30 minutes before breakfast  You may continue Pepcid at bedtime.     Take zofran (ondansetron) as needed for nausea.     Please call [phone] to schedule an upper endoscopy with Dr. Garcia.   Schedule a follow up with Davey Mahoney PA-C for 2 weeks after your upper endoscopy.

## 2019-03-28 NOTE — NURSING NOTE
"Reva Garza's goals for this visit include:   Chief Complaint   Patient presents with     Consult     IBS; Hospitalized for esophageal spasm       She requests these members of her care team be copied on today's visit information: Yes    PCP: Pat Garibay    Referring Provider:  No referring provider defined for this encounter.    /89 (BP Location: Left arm, Patient Position: Sitting, Cuff Size: Adult Regular)   Pulse 90   Ht 1.676 m (5' 6\")   Wt 68.1 kg (150 lb 1.6 oz)   SpO2 97%   BMI 24.23 kg/m      Do you need any medication refills at today's visit? No    Madeline Nuñez LPN      "

## 2019-04-10 ENCOUNTER — TELEPHONE (OUTPATIENT)
Dept: INTERNAL MEDICINE | Facility: CLINIC | Age: 34
End: 2019-04-10

## 2019-04-10 NOTE — TELEPHONE ENCOUNTER
Panel Management Review        Composite cancer screening  Chart review shows that this patient is due/due soon for the following Pap Smear  Summary:    Patient is due/failing the following:   PAP and PHYSICAL    Action needed:   Patient needs office visit for physical.    Type of outreach:    Sent [employer] message.    Questions for provider review:                                                                                                                                        Marianela Mason MA

## 2019-04-18 SDOH — HEALTH STABILITY: MENTAL HEALTH: HOW OFTEN DO YOU HAVE A DRINK CONTAINING ALCOHOL?: NEVER

## 2019-04-22 ENCOUNTER — HOSPITAL ENCOUNTER (OUTPATIENT)
Facility: AMBULATORY SURGERY CENTER | Age: 34
Discharge: HOME | End: 2019-04-22
Attending: INTERNAL MEDICINE | Admitting: INTERNAL MEDICINE
Payer: COMMERCIAL

## 2019-04-22 ENCOUNTER — SURGERY (OUTPATIENT)
Age: 34
End: 2019-04-22
Payer: COMMERCIAL

## 2019-04-22 VITALS
SYSTOLIC BLOOD PRESSURE: 113 MMHG | RESPIRATION RATE: 16 BRPM | DIASTOLIC BLOOD PRESSURE: 77 MMHG | HEART RATE: 89 BPM | OXYGEN SATURATION: 96 % | TEMPERATURE: 98.2 F

## 2019-04-22 LAB — UPPER GI ENDOSCOPY: NORMAL

## 2019-04-22 PROCEDURE — 43239 EGD BIOPSY SINGLE/MULTIPLE: CPT

## 2019-04-22 PROCEDURE — G8918 PT W/O PREOP ORDER IV AB PRO: HCPCS

## 2019-04-22 PROCEDURE — 43239 EGD BIOPSY SINGLE/MULTIPLE: CPT | Performed by: INTERNAL MEDICINE

## 2019-04-22 PROCEDURE — 88305 TISSUE EXAM BY PATHOLOGIST: CPT | Performed by: INTERNAL MEDICINE

## 2019-04-22 PROCEDURE — G8907 PT DOC NO EVENTS ON DISCHARG: HCPCS

## 2019-04-22 RX ORDER — FENTANYL CITRATE 50 UG/ML
INJECTION, SOLUTION INTRAMUSCULAR; INTRAVENOUS PRN
Status: DISCONTINUED | OUTPATIENT
Start: 2019-04-22 | End: 2019-04-22 | Stop reason: HOSPADM

## 2019-04-22 RX ORDER — ONDANSETRON 2 MG/ML
4 INJECTION INTRAMUSCULAR; INTRAVENOUS
Status: DISCONTINUED | OUTPATIENT
Start: 2019-04-22 | End: 2019-04-23 | Stop reason: HOSPADM

## 2019-04-22 RX ORDER — LIDOCAINE 40 MG/G
CREAM TOPICAL
Status: DISCONTINUED | OUTPATIENT
Start: 2019-04-22 | End: 2019-04-23 | Stop reason: HOSPADM

## 2019-04-22 RX ADMIN — FENTANYL CITRATE 50 MCG: 50 INJECTION, SOLUTION INTRAMUSCULAR; INTRAVENOUS at 13:25

## 2019-04-22 RX ADMIN — FENTANYL CITRATE 50 MCG: 50 INJECTION, SOLUTION INTRAMUSCULAR; INTRAVENOUS at 13:20

## 2019-04-22 RX ADMIN — FENTANYL CITRATE 25 MCG: 50 INJECTION, SOLUTION INTRAMUSCULAR; INTRAVENOUS at 13:28

## 2019-04-24 LAB — COPATH REPORT: NORMAL

## 2019-05-06 ENCOUNTER — OFFICE VISIT (OUTPATIENT)
Dept: GASTROENTEROLOGY | Facility: CLINIC | Age: 34
End: 2019-05-06
Payer: COMMERCIAL

## 2019-05-06 VITALS
OXYGEN SATURATION: 97 % | HEIGHT: 66 IN | SYSTOLIC BLOOD PRESSURE: 131 MMHG | BODY MASS INDEX: 24.54 KG/M2 | DIASTOLIC BLOOD PRESSURE: 89 MMHG | WEIGHT: 152.7 LBS | HEART RATE: 88 BPM

## 2019-05-06 DIAGNOSIS — R11.0 NAUSEA: ICD-10-CM

## 2019-05-06 DIAGNOSIS — R07.89 ATYPICAL CHEST PAIN: Primary | ICD-10-CM

## 2019-05-06 DIAGNOSIS — K21.9 GASTROESOPHAGEAL REFLUX DISEASE WITHOUT ESOPHAGITIS: ICD-10-CM

## 2019-05-06 LAB
ALBUMIN SERPL-MCNC: 4 G/DL (ref 3.4–5)
ALP SERPL-CCNC: 80 U/L (ref 40–150)
ALT SERPL W P-5'-P-CCNC: 13 U/L (ref 0–50)
ANION GAP SERPL CALCULATED.3IONS-SCNC: 5 MMOL/L (ref 3–14)
AST SERPL W P-5'-P-CCNC: 13 U/L (ref 0–45)
BASOPHILS # BLD AUTO: 0.1 10E9/L (ref 0–0.2)
BASOPHILS NFR BLD AUTO: 1.2 %
BILIRUB SERPL-MCNC: 0.3 MG/DL (ref 0.2–1.3)
BUN SERPL-MCNC: 9 MG/DL (ref 7–30)
CALCIUM SERPL-MCNC: 9.3 MG/DL (ref 8.5–10.1)
CHLORIDE SERPL-SCNC: 105 MMOL/L (ref 94–109)
CO2 SERPL-SCNC: 27 MMOL/L (ref 20–32)
CREAT SERPL-MCNC: 0.74 MG/DL (ref 0.52–1.04)
DEPRECATED CALCIDIOL+CALCIFEROL SERPL-MC: 71 UG/L (ref 20–75)
DIFFERENTIAL METHOD BLD: NORMAL
EOSINOPHIL # BLD AUTO: 0 10E9/L (ref 0–0.7)
EOSINOPHIL NFR BLD AUTO: 0.5 %
ERYTHROCYTE [DISTWIDTH] IN BLOOD BY AUTOMATED COUNT: 12.6 % (ref 10–15)
FOLATE SERPL-MCNC: 17.6 NG/ML
GFR SERPL CREATININE-BSD FRML MDRD: >90 ML/MIN/{1.73_M2}
GLUCOSE SERPL-MCNC: 84 MG/DL (ref 70–99)
HCT VFR BLD AUTO: 45.1 % (ref 35–47)
HGB BLD-MCNC: 14.8 G/DL (ref 11.7–15.7)
IMM GRANULOCYTES # BLD: 0 10E9/L (ref 0–0.4)
IMM GRANULOCYTES NFR BLD: 0.5 %
LYMPHOCYTES # BLD AUTO: 1.9 10E9/L (ref 0.8–5.3)
LYMPHOCYTES NFR BLD AUTO: 28.2 %
MCH RBC QN AUTO: 29.4 PG (ref 26.5–33)
MCHC RBC AUTO-ENTMCNC: 32.8 G/DL (ref 31.5–36.5)
MCV RBC AUTO: 90 FL (ref 78–100)
MONOCYTES # BLD AUTO: 0.5 10E9/L (ref 0–1.3)
MONOCYTES NFR BLD AUTO: 7 %
NEUTROPHILS # BLD AUTO: 4.1 10E9/L (ref 1.6–8.3)
NEUTROPHILS NFR BLD AUTO: 62.6 %
PLATELET # BLD AUTO: 441 10E9/L (ref 150–450)
POTASSIUM SERPL-SCNC: 3.7 MMOL/L (ref 3.4–5.3)
PROT SERPL-MCNC: 8.4 G/DL (ref 6.8–8.8)
RBC # BLD AUTO: 5.04 10E12/L (ref 3.8–5.2)
SODIUM SERPL-SCNC: 137 MMOL/L (ref 133–144)
TSH SERPL DL<=0.005 MIU/L-ACNC: 2.14 MU/L (ref 0.4–4)
VIT B12 SERPL-MCNC: 256 PG/ML (ref 193–986)
WBC # BLD AUTO: 6.6 10E9/L (ref 4–11)

## 2019-05-06 PROCEDURE — 80053 COMPREHEN METABOLIC PANEL: CPT | Performed by: PHYSICIAN ASSISTANT

## 2019-05-06 PROCEDURE — 84443 ASSAY THYROID STIM HORMONE: CPT | Performed by: PHYSICIAN ASSISTANT

## 2019-05-06 PROCEDURE — 99214 OFFICE O/P EST MOD 30 MIN: CPT | Performed by: PHYSICIAN ASSISTANT

## 2019-05-06 PROCEDURE — 82607 VITAMIN B-12: CPT | Performed by: PHYSICIAN ASSISTANT

## 2019-05-06 PROCEDURE — 85025 COMPLETE CBC W/AUTO DIFF WBC: CPT | Performed by: PHYSICIAN ASSISTANT

## 2019-05-06 PROCEDURE — 82746 ASSAY OF FOLIC ACID SERUM: CPT | Performed by: PHYSICIAN ASSISTANT

## 2019-05-06 PROCEDURE — 82306 VITAMIN D 25 HYDROXY: CPT | Performed by: PHYSICIAN ASSISTANT

## 2019-05-06 PROCEDURE — 36415 COLL VENOUS BLD VENIPUNCTURE: CPT | Performed by: PHYSICIAN ASSISTANT

## 2019-05-06 ASSESSMENT — PAIN SCALES - GENERAL: PAINLEVEL: NO PAIN (0)

## 2019-05-06 ASSESSMENT — MIFFLIN-ST. JEOR: SCORE: 1409.39

## 2019-05-06 NOTE — NURSING NOTE
"Reva Garza's goals for this visit include:   Chief Complaint   Patient presents with     RECHECK     F/U EGD on 4/22/19       She requests these members of her care team be copied on today's visit information: Yes, patient has no PCP    PCP: [facility]    Referring Provider:  Davey Mahoney PA-C  [id number] [place] [address]    /89 (BP Location: Left arm, Patient Position: Sitting, Cuff Size: Adult Regular)   Pulse 88   Ht 1.676 m (5' 6\")   Wt 69.3 kg (152 lb 11.2 oz)   SpO2 97%   BMI 24.65 kg/m      Do you need any medication refills at today's visit? No    Madeline Nuñez LPN      "

## 2019-05-06 NOTE — PATIENT INSTRUCTIONS
Blood work today  You should receive a phone call from the [facility] to schedule an esophageal manometry and pH impedence study.     Follow up 2-3 weeks after the procedures.

## 2019-05-06 NOTE — PROGRESS NOTES
GASTROENTEROLOGY FOLLOW UP CLINIC VISIT    CC/REFERRING MD:    [facility]      REASON FOR CONSULTATION:  RECHECK (F/U EGD on 4/22/19)      HISTORY OF PRESENT ILLNESS:    Reva Garza is 33 year old female who presents for follow up.    She was initially seen on 3/28/2019 for atypical chest pain, GERD and IBS. Prior to our initial visit she was seen at [place] emergency room on 3/12/19 for evaluation of chest pain.  Chest pain started early that morning while driving to work.  She continued to have intermittent substernal chest pain that day that she described as a heaviness or tightness sensation. Her symptoms progressed to include left arm tingling sensation. In the ER she was evaluated with labs and EKG. She was noted to have slightly elevated troponin levels.  EKG was negative for any acute ischemic changes but she was noted to be tachycardic.  Her chest x-ray was clear.  She was tested with a CT pulmonary angiogram to rule out PE and this was also found to be negative.  She was admitted for observation for serial troponins and further cardiac workup.  She was evaluated with a stress echo during admission was which was found to be normal.  Her chest pain was thought to be related to esophageal spasm due to significant GERD and she was referred to our office for further management.     She was exhibiting daily reflux symptoms despite omeprazole 20mg every morning and pepcid at bedtime. We changed her omeprazole 20mg every morning to protonix 40mg every morning and we continued her pepcid at bedtime. We further evaluated with an upper endoscopy on 4/22/19 which showed a normal stomach and duodenum with a possible hiatal hernia. Clear reason for her atypical chest pain was not noted.  Gastric biopsies revealed reactive gastropathy but were negative for H. pylori.  Esophageal biopsy was negative for reflux or eosinophilic esophagitis.       She feels that her heartburn has improved  since taking protonix 40mg daily and pepcid nightly however she continues to have nausea and intermittent chest pain. There is no vomiting or dysphagia.         PREVIOUS ENDOSCOPY:  Upper endoscopy 4/22/2019  Impression:      - Esophagogastric landmarks identified.                            - Gastroesophageal flap valve classified as Hill Grade III (minimal fold, loose to endoscope, hiatal hernia likely).                            - Z-line irregular, 35 cm from the incisors.                            - Normal stomach.                            - Normal duodenal bulb, first portion of the duodenum and second portion of the duodenum.                            - Biopsy performed in the lower third of the esophagus and in the middle third of the esophagus.                            - Biopsies were taken with a cold forceps for Helicobacter pylori testing.                            - No clear findings to account for chest pain. It is possible that symptoms could be related to reflux in the setting of loose EG valve. Biopsies taken from esophagus to r/o EoE and of stomach to r/o HP as causes of underlying symptoms.       SPECIMEN(S):   A: Gastric antrum biopsies   B: Esophageal biopsy     FINAL DIAGNOSIS:   A. GASTRIC ANTRUM, BIOPSIES:   - Oxyntic and antral mucosa with reactive gastropathy involving the antrum   - No H. pylori like organisms identified on routine staining   - Negative for intestinal metaplasia or dysplasia     B.  ESOPHAGUS, BIOPSY:   - Squamous mucosa with no significant histologic abnormality   - No evidence of reflux or eosinophilic esophagitis     I have personally reviewed all specimens and/or slides, including the listed special stains, and used them   with my medical judgement to determine or confirm the final diagnosis.     Electronically signed out by:     Rod Winters M.D., [facility]         PERTINENT PAST MEDICAL HISTORY:    Past Medical History:   Diagnosis Date     Corpus luteum  cyst or hematoma 1/2004    ruptured with ER eval and resolution, had f/u with GYN physician Dr. Arroyo.        PREVIOUS SURGERIES:   Past Surgical History:   Procedure Laterality Date     APPENDECTOMY  7/2011     BREAST SURGERY      Augmentation     COMBINED ESOPHAGOSCOPY, GASTROSCOPY, DUODENOSCOPY (EGD) WITH CO2 INSUFFLATION N/A 4/22/2019    Procedure: COMBINED ESOPHAGOSCOPY, GASTROSCOPY, DUODENOSCOPY (EGD) WITH CO2 INSUFFLATION;  Surgeon: Germán Garcia MD;  Location: [facility] OR     ESOPHAGOSCOPY, GASTROSCOPY, DUODENOSCOPY (EGD), COMBINED N/A 4/22/2019    Procedure: Combined Esophagoscopy, Gastroscopy, Duodenoscopy (Egd), Biopsy Single Or Multiple;  Surgeon: Germán Garcia MD;  Location: [facility] OR       ALLERGIES:     Allergies   Allergen Reactions     No Known Drug Allergies        PERTINENT MEDICATIONS:    Current Outpatient Medications:      Dapsone 5 % GEL, , Disp: , Rfl:      famotidine (PEPCID) 20 MG tablet, Take 1 tablet (20 mg) by mouth 2 times daily, Disp: 60 tablet, Rfl: 0     hyoscyamine (ANASPAZ/LEVSIN) 0.125 MG tablet, Take 1-2 tablets (125-250 mcg) by mouth 2 times daily as needed for cramping, Disp: 40 tablet, Rfl: 1     loperamide (IMODIUM) 2 MG capsule, 2 mg, Disp: , Rfl:      norethindrone-ethinyl estradiol (JUNEL FE 1/20) 1-20 MG-MCG per tablet, Take 1 tablet by mouth daily, Disp: , Rfl:      ondansetron (ZOFRAN) 4 MG tablet, Take 1 tablet (4 mg) by mouth every 8 hours as needed for nausea, Disp: 20 tablet, Rfl: 1     pantoprazole (PROTONIX) 40 MG EC tablet, Take 1 tablet (40 mg) by mouth every morning On an empty stomach, Disp: 90 tablet, Rfl: 0     spironolactone (ALDACTONE) 100 MG tablet, Take 100 mg by mouth 2 times daily, Disp: , Rfl:      Vitamin D2, Ergocalciferol, 2000 units CAPS, Take 1 cap PO daily, Disp: , Rfl: 0    FAMILY HISTORY:   Family History   Problem Relation Age of Onset     Hypertension Mother      Lipids Mother      Breast Cancer Mother 61        breast      "Depression Mother      Diabetes Father      Hypertension Father      Lipids Father      Irritable Bowel Syndrome Father         and brother     Hypertension Paternal Grandmother      Lipids Paternal Grandmother      Heart Disease Maternal Grandfather      Thyroid Disease Maternal Grandmother      Lipids Paternal Grandfather      Hypertension Paternal Grandfather      Gallbladder Disease Paternal Grandfather         GMa, 2aunts, cousin          ROS:    No fevers or chills  No weight loss  No blurry vision, double vision or change in vision  No sore throat  No lymphadenopathy  No headache, paraesthesias, or weakness in a limb  No shortness of breath or wheezing  No arthralgias or myalgias  No rashes or skin changes  No odynophagia or dysphagia  No BRBPR, hematochezia, melena  No dysuria, frequency or urgency  No hot/cold intolerance or polyria  No anxiety or depression  PHYSICAL EXAMINATION:  Constitutional: aaox3, cooperative, pleasant, not dyspneic/diaphoretic, no acute distress  Vitals reviewed: /89 (BP Location: Left arm, Patient Position: Sitting, Cuff Size: Adult Regular)   Pulse 88   Ht 1.676 m (5' 6\")   Wt 69.3 kg (152 lb 11.2 oz)   SpO2 97%   BMI 24.65 kg/m     Wt:   Wt Readings from Last 2 Encounters:   05/06/19 69.3 kg (152 lb 11.2 oz)   03/28/19 68.1 kg (150 lb 1.6 oz)        Physical Exam    Eyes: Sclera anicteric/injected  Ears/nose/mouth/throat: Normal oropharynx without ulcers or exudate, mucus membranes moist, hearing intact  Neck: supple, thyroid normal size  CV: No edema  Respiratory: Unlabored breathing  Lymph: No submandibular, supraclavicular or inguinal lymphadenopathy  Abd: Nondistended, no masses, +bs, no hepatosplenomegaly, nontender, no peritoneal signs  Skin: warm, perfused, no jaundice  Psych: Normal affect  MSK: Normal gait      PERTINENT STUDIES:   Most recent CBC:  Recent Labs   Lab Test 08/03/16  1228 07/17/15  1800   WBC 8.2 9.7   HGB 15.2 15.1   HCT 46.3 44.7    414 "     Most recent hepatic panel:  Recent Labs   Lab Test 08/01/13  1344 06/28/11  1210   ALT 19 <6   AST 23 16     Most recent creatinine:  Recent Labs   Lab Test 03/12/19 08/01/13  1344   CR 0.87 0.72           ASSESSMENT/PLAN:    Reva Garza is a 33 year old female who presents for follow up of her atypical chest pain. We reviewed her upper endoscopy today which was overall unremarkable. She does note improvement in heartburn since increasing to protonix 40mg every morning and pepcid 20mg every evening however there is no change in her chest pain. We will further evaluate with a manometry and pH impedence study at this time. We will continue her current acid reflux regimen. Further recommendations after procedures.        Atypical chest pain  Gastroesophageal reflux disease without esophagitis  Nausea    Orders Placed This Encounter   Procedures     CBC with platelets differential     Comprehensive metabolic panel     TSH with free T4 reflex     Vitamin B12     Folate     Vitamin D Deficiency     GASTROENTEROLOGY ADULT REF PROCEDURE ONLY [facility] [phone]          Follow up 2-3 weeks after procedures.     Thank you for this consultation.  It was a pleasure to participate in the care of this patient; please contact us with any further questions.      This note was created with voice recognition software, and while reviewed for accuracy, typos may remain.     Davey Mahoney PA-C  Gastroenterology   [facility]

## 2019-05-07 ENCOUNTER — RX ONLY (RX ONLY)
Age: 34
End: 2019-05-07

## 2019-05-07 RX ORDER — DAPSONE 75 MG/G
7.5 GEL TOPICAL ONCE DAILY
Qty: 1 | Refills: 0 | COMMUNITY
Start: 2019-05-07

## 2019-05-20 ENCOUNTER — APPOINTMENT (OUTPATIENT)
Age: 34
Setting detail: DERMATOLOGY
End: 2019-05-22

## 2019-05-20 VITALS — HEIGHT: 66 IN | WEIGHT: 150 LBS | RESPIRATION RATE: 16 BRPM

## 2019-05-20 DIAGNOSIS — L82.1 OTHER SEBORRHEIC KERATOSIS: ICD-10-CM

## 2019-05-20 DIAGNOSIS — L70.0 ACNE VULGARIS: ICD-10-CM

## 2019-05-20 PROBLEM — D48.5 NEOPLASM OF UNCERTAIN BEHAVIOR OF SKIN: Status: ACTIVE | Noted: 2019-05-20

## 2019-05-20 PROCEDURE — OTHER PRESCRIPTION: OTHER

## 2019-05-20 PROCEDURE — OTHER COUNSELING: OTHER

## 2019-05-20 PROCEDURE — OTHER REASSURANCE: OTHER

## 2019-05-20 PROCEDURE — 11102 TANGNTL BX SKIN SINGLE LES: CPT

## 2019-05-20 PROCEDURE — 99213 OFFICE O/P EST LOW 20 MIN: CPT | Mod: 25

## 2019-05-20 PROCEDURE — OTHER PRESCRIPTION MEDICATION MANAGEMENT: OTHER

## 2019-05-20 PROCEDURE — OTHER BIOPSY BY SHAVE METHOD: OTHER

## 2019-05-20 RX ORDER — SPIRONOLACTONE 100 MG/1
100MG TABLET, FILM COATED ORAL BID
Qty: 90 | Refills: 4 | Status: ERX | COMMUNITY
Start: 2019-05-20

## 2019-05-20 ASSESSMENT — LOCATION SIMPLE DESCRIPTION DERM
LOCATION SIMPLE: LEFT FOREHEAD
LOCATION SIMPLE: RIGHT CHEEK

## 2019-05-20 ASSESSMENT — LOCATION DETAILED DESCRIPTION DERM
LOCATION DETAILED: RIGHT CENTRAL MALAR CHEEK
LOCATION DETAILED: LEFT FOREHEAD

## 2019-05-20 ASSESSMENT — LOCATION ZONE DERM: LOCATION ZONE: FACE

## 2019-05-20 NOTE — PROCEDURE: COUNSELING
Erythromycin Counseling:  I discussed with the patient the risks of erythromycin including but not limited to GI upset, allergic reaction, drug rash, diarrhea, increase in liver enzymes, and yeast infections.
Include Pregnancy/Lactation Warning?: No
Doxycycline Pregnancy And Lactation Text: This medication is Pregnancy Category D and not consider safe during pregnancy. It is also excreted in breast milk but is considered safe for shorter treatment courses.
Isotretinoin Counseling: Patient should get monthly blood tests, not donate blood, not drive at night if vision affected, not share medication, and not undergo elective surgery for 6 months after tx completed. Side effects reviewed, pt to contact office should one occur.
Bactrim Counseling:  I discussed with the patient the risks of sulfa antibiotics including but not limited to GI upset, allergic reaction, drug rash, diarrhea, dizziness, photosensitivity, and yeast infections.  Rarely, more serious reactions can occur including but not limited to aplastic anemia, agranulocytosis, methemoglobinemia, blood dyscrasias, liver or kidney failure, lung infiltrates or desquamative/blistering drug rashes.
Topical Clindamycin Pregnancy And Lactation Text: This medication is Pregnancy Category B and is considered safe during pregnancy. It is unknown if it is excreted in breast milk.
Minocycline Pregnancy And Lactation Text: This medication is Pregnancy Category D and not consider safe during pregnancy. It is also excreted in breast milk.
Topical Retinoid Pregnancy And Lactation Text: This medication is Pregnancy Category C. It is unknown if this medication is excreted in breast milk.
Isotretinoin Pregnancy And Lactation Text: This medication is Pregnancy Category X and is considered extremely dangerous during pregnancy. It is unknown if it is excreted in breast milk.
Detail Level: Zone
Spironolactone Counseling: Patient advised regarding risks of diarrhea, abdominal pain, hyperkalemia, birth defects (for female patients), liver toxicity and renal toxicity. The patient may need blood work to monitor liver and kidney function and potassium levels while on therapy. The patient verbalized understanding of the proper use and possible adverse effects of spironolactone.  All of the patient's questions and concerns were addressed.
Tazorac Pregnancy And Lactation Text: This medication is not safe during pregnancy. It is unknown if this medication is excreted in breast milk.
Erythromycin Pregnancy And Lactation Text: This medication is Pregnancy Category B and is considered safe during pregnancy. It is also excreted in breast milk.
Tetracycline Counseling: Patient counseled regarding possible photosensitivity and increased risk for sunburn.  Patient instructed to avoid sunlight, if possible.  When exposed to sunlight, patients should wear protective clothing, sunglasses, and sunscreen.  The patient was instructed to call the office immediately if the following severe adverse effects occur:  hearing changes, easy bruising/bleeding, severe headache, or vision changes.  The patient verbalized understanding of the proper use and possible adverse effects of tetracycline.  All of the patient's questions and concerns were addressed. Patient understands to avoid pregnancy while on therapy due to potential birth defects.
Topical Sulfur Applications Pregnancy And Lactation Text: This medication is Pregnancy Category C and has an unknown safety profile during pregnancy. It is unknown if this topical medication is excreted in breast milk.
Tazorac Counseling:  Patient advised that medication is irritating and drying.  Patient may need to apply sparingly and wash off after an hour before eventually leaving it on overnight.  The patient verbalized understanding of the proper use and possible adverse effects of tazorac.  All of the patient's questions and concerns were addressed.
Doxycycline Counseling:  Patient counseled regarding possible photosensitivity and increased risk for sunburn.  Patient instructed to avoid sunlight, if possible.  When exposed to sunlight, patients should wear protective clothing, sunglasses, and sunscreen.  The patient was instructed to call the office immediately if the following severe adverse effects occur:  hearing changes, easy bruising/bleeding, severe headache, or vision changes.  The patient verbalized understanding of the proper use and possible adverse effects of doxycycline.  All of the patient's questions and concerns were addressed.
Azithromycin Counseling:  I discussed with the patient the risks of azithromycin including but not limited to GI upset, allergic reaction, drug rash, diarrhea, and yeast infections.
Bactrim Pregnancy And Lactation Text: This medication is Pregnancy Category D and is known to cause fetal risk.  It is also excreted in breast milk.
Benzoyl Peroxide Counseling: Patient counseled that medicine may cause skin irritation and bleach clothing.  In the event of skin irritation, the patient was advised to reduce the amount of the drug applied or use it less frequently.   The patient verbalized understanding of the proper use and possible adverse effects of benzoyl peroxide.  All of the patient's questions and concerns were addressed.
Topical Sulfur Applications Counseling: Topical Sulfur Counseling: Patient counseled that this medication may cause skin irritation or allergic reactions.  In the event of skin irritation, the patient was advised to reduce the amount of the drug applied or use it less frequently.   The patient verbalized understanding of the proper use and possible adverse effects of topical sulfur application.  All of the patient's questions and concerns were addressed.
High Dose Vitamin A Pregnancy And Lactation Text: High dose vitamin A therapy is contraindicated during pregnancy and breast feeding.
Topical Retinoid counseling:  Patient advised to apply a pea-sized amount only at bedtime and wait 30 minutes after washing their face before applying.  If too drying, patient may add a non-comedogenic moisturizer. The patient verbalized understanding of the proper use and possible adverse effects of retinoids.  All of the patient's questions and concerns were addressed.
Dapsone Pregnancy And Lactation Text: This medication is Pregnancy Category C and is not considered safe during pregnancy or breast feeding.
Spironolactone Pregnancy And Lactation Text: This medication can cause feminization of the male fetus and should be avoided during pregnancy. The active metabolite is also found in breast milk.
Azithromycin Pregnancy And Lactation Text: This medication is considered safe during pregnancy and is also secreted in breast milk.
Minocycline Counseling: Patient advised regarding possible photosensitivity and discoloration of the teeth, skin, lips, tongue and gums.  Patient instructed to avoid sunlight, if possible.  When exposed to sunlight, patients should wear protective clothing, sunglasses, and sunscreen.  The patient was instructed to call the office immediately if the following severe adverse effects occur:  hearing changes, easy bruising/bleeding, severe headache, or vision changes.  The patient verbalized understanding of the proper use and possible adverse effects of minocycline.  All of the patient's questions and concerns were addressed.
Patient Specific Counseling (Will Not Stick From Patient To Patient): *** Refilling spironolactone today
Dapsone Counseling: I discussed with the patient the risks of dapsone including but not limited to hemolytic anemia, agranulocytosis, rashes, methemoglobinemia, kidney failure, peripheral neuropathy, headaches, GI upset, and liver toxicity.  Patients who start dapsone require monitoring including baseline LFTs and weekly CBCs for the first month, then every month thereafter.  The patient verbalized understanding of the proper use and possible adverse effects of dapsone.  All of the patient's questions and concerns were addressed.
Benzoyl Peroxide Pregnancy And Lactation Text: This medication is Pregnancy Category C. It is unknown if benzoyl peroxide is excreted in breast milk.
High Dose Vitamin A Counseling: Side effects reviewed, pt to contact office should one occur.
Topical Clindamycin Counseling: Patient counseled that this medication may cause skin irritation or allergic reactions.  In the event of skin irritation, the patient was advised to reduce the amount of the drug applied or use it less frequently.   The patient verbalized understanding of the proper use and possible adverse effects of clindamycin.  All of the patient's questions and concerns were addressed.
Birth Control Pills Counseling: Birth Control Pill Counseling: I discussed with the patient the potential side effects of OCPs including but not limited to increased risk of stroke, heart attack, thrombophlebitis, deep venous thrombosis, hepatic adenomas, breast changes, GI upset, headaches, and depression.  The patient verbalized understanding of the proper use and possible adverse effects of OCPs. All of the patient's questions and concerns were addressed.
Birth Control Pills Pregnancy And Lactation Text: This medication should be avoided if pregnant and for the first 30 days post-partum.

## 2019-05-20 NOTE — PROCEDURE: BIOPSY BY SHAVE METHOD
Post-Care Instructions: WOUND CARE:\\nDo NOT submerge wound in a bath, swimming pool, or hot tub for at least 1 week or for as long as there is an open wound. Gently cleanse the site daily with mild soap and water. Be careful NOT to allow a forceful stream of water to hit the biopsy site. After cleaning/showering, apply a thin layer of petrolatum ointment or Aquaphor in the wound followed by an adhesive bandage. Continue this process daily until healed. \\n\\nBLEEDING:\\nIf you develop persistent bleeding, apply firm and steady pressure over the dressing with gauze for 15 minutes. If bleeding persists, reapply pressure with an ice pack over the gauze for 15 minutes. NEVER APPLY ICE DIRECTLY TO THE SKIN. Do NOT peek under the gauze during these 15 minutes of pressure.  Call our office at [phone] if you cannot get the bleeding to stop. \\n\\nINFECTION:\\nSigns of infection may include increasing rather than decreasing pain (after the first few days), increasing redness/swelling/heat, draining pus, pink/red streaks around the wound, and fever or chills.  Please call our office immediately at [phone] if infection is suspected. It is normal to have some mild redness on or around the wound edges; this will lighten day by day and will become less tender.\\n\\nPAIN:\\nPain is usually minimal, but if needed you may take acetaminophen (Tylenol) every four hours as needed. Applying an ice pack over the dressing for 15-20 minutes every 2-3 hours will relieve swelling, lessen pain, and help minimize bruising. NEVER APPLY ICE DIRECTLY TO THE SKIN. Avoid ibuprofen (Advil, Motrin) and naproxen (Aleve) for the first 48 hours as these can increase bleeding.\\n\\nSWELLIG AND BRUISING:\\nSwelling and bruising are common and temporary, usually lasting 1 - 2 weeks. It is more common in areas treated around the eyes, hands, and feet. In the days following the procedure, swelling and bruising can be minimized by keeping the affected areas elevated when possible, reducing salty foods, and applying ice packs over the dressing for 15-20 minutes every 2-3 hours. NEVER APPLY ICE DIRECTLY TO THE SKIN.\\n\\nITCHING:\\nItchiness on and around the wound is very common and can last several days to weeks after surgery. Mild itch is normal as the wound is healing. \\n\\nNERVE CHANGES:\\nNumbness is usually temporary, but it may last for several weeks to months. You may also experience sharp pains at the wound sight as it heals.  Mild pain is normal and will gradually improve with time.\\n \\nNO SMOKING:\\nSmoking will delay the healing process. If you smoke, we recommend trying to quit or at minimum reduce how much you smoke following a procedure.

## 2019-05-20 NOTE — PROCEDURE: BIOPSY BY SHAVE METHOD
Consent: - Verbal and written consent was obtained and risks were reviewed prior to procedure today. \\n- Risks discussed include but are not limited to scarring, infection, bleeding, scabbing, incomplete removal, nerve damage, pain, and allergy to anesthesia.

## 2019-05-20 NOTE — PROCEDURE: BIOPSY BY SHAVE METHOD
Notification Instructions: - It can take up to 2 weeks to get a biopsy result. \\n- Please refrain from calling to request results until after 2 weeks.

## 2019-05-20 NOTE — PROCEDURE: BIOPSY BY SHAVE METHOD
Path Notes (To The Dermatopathologist): Atypical squamous proliferation [id number] Path Notes (To The Dermatopathologist): Atypical squamous proliferation [id number]

## 2019-09-09 ENCOUNTER — RX ONLY (RX ONLY)
Age: 34
End: 2019-09-09

## 2019-09-09 RX ORDER — DAPSONE 75 MG/G
7.5% GEL TOPICAL
Qty: 1 | Refills: 2 | Status: ERX

## 2019-09-26 ENCOUNTER — APPOINTMENT (OUTPATIENT)
Age: 34
Setting detail: DERMATOLOGY
End: 2019-09-26

## 2019-09-26 DIAGNOSIS — Z41.9 ENCOUNTER FOR PROCEDURE FOR PURPOSES OTHER THAN REMEDYING HEALTH STATE, UNSPECIFIED: ICD-10-CM

## 2019-09-26 PROCEDURE — OTHER COSMETIC CONSULTATION: FILLERS: OTHER

## 2019-10-01 ENCOUNTER — APPOINTMENT (OUTPATIENT)
Age: 34
Setting detail: DERMATOLOGY
End: 2019-10-02

## 2019-10-01 DIAGNOSIS — Z41.9 ENCOUNTER FOR PROCEDURE FOR PURPOSES OTHER THAN REMEDYING HEALTH STATE, UNSPECIFIED: ICD-10-CM

## 2019-10-01 PROCEDURE — OTHER RESTYLANE SILK INJECTION: OTHER

## 2019-10-01 NOTE — PROCEDURE: RESTYLANE SILK INJECTION
Consent: Cosmetic:\\n\\n-Treatment areas reviewed with patient holding a hand held mirror. The patient has realistic expectations.  \\n-Discussed and recommend Using remainder in medial tear troughs, more in the L side\\n-Wrinkle assessment:  (“Darryl Aesthetics Scales”) Wrinkes= mild, Lines Present at Rest= none-mild, Folds= mild, Volume Loss & Skin Laxity=mild. \\n-Glogau wrinkle scale assessment :  I-II\\n-The patient was informed that the treatment area is considered off-label.\\n-Written consent obtained. Questions answered. Patient verbalized understanding of the content.  See written informed consent on file. \\n-The skin was prepped with with alcohol and chlorhexadine prep. \\n\\n-Skin markings made and filler was administered to the treatment areas noted below:\\nPlan : Restylane Silk Injection\\nFiller: Restylane Silk\\nLot Number: [id number]\\nExpiration Date: 10/31/2021\\nCannula Used: 1 inch 27G DermaSculpt cannula\\n-A 31g BD insulin syringe was back filled and used to inject micro droplets of Restylane Silk in lateral tear troughs.\\nSite(s): \\nTear Troughs - 0.2 cc\\nDepth of Injection: deep dermis, subdermal plane, and supra-periosteal plane\\n-Aseptic technique was maintained throughout treatment.\\n-The patient tolerated the procedure well w/o incident. \\n\\n-Additional product (hyaluronic ) may be necessary for optimal correction following treatment and/or maintenance. \\n-Instructed to avoid NSAIDS, Vitamin E, Fish Oil, Ginkgo Balboa, and alcohol 5-7 days before filler treatment.\\n\\n-Any remaining product was discarded.\\n-Ice provided post treatment for 2 to 5 minutes and or to take home. Patient instructed to apply ice 20 minutes on, 20 minutes off while awake for one to 1 to 2 days to reduce swelling. Avoid massage in the area. An over-the-counter antihistamine may be beneficial and was recommended to help with swelling.  Patient instructed to notify clinic if any bruising worsens, travels or becomes painful. Consent: Cosmetic:\\n\\n-Treatment areas reviewed with patient holding a hand held mirror. The patient has realistic expectations.  \\n-Discussed and recommend Using remainder in medial tear troughs, more in the L side\\n-Wrinkle assessment:  (“Darryl Aesthetics Scales”) Wrinkes= mild, Lines Present at Rest= none-mild, Folds= mild, Volume Loss & Skin Laxity=mild. \\n-Glogau wrinkle scale assessment :  I-II\\n-The patient was informed that the treatment area is considered off-label.\\n-Written consent obtained. Questions answered. Patient verbalized understanding of the content.  See written informed consent on file. \\n-The skin was prepped with with alcohol and chlorhexadine prep. \\n\\n-Skin markings made and filler was administered to the treatment areas noted below:\\nPlan : Restylane Silk Injection\\nFiller: Restylane Silk\\nLot Number: [id number]\\nExpiration Date: 10/31/2021\\nCannula Used: 1 inch 27G DermaSculpt cannula\\n-A 31g BD insulin syringe was back filled and used to inject micro droplets of Restylane Silk in lateral tear troughs.\\nSite(s): \\nTear Troughs - 0.2 cc\\nDepth of Injection: deep dermis, subdermal plane, and supra-periosteal plane\\n-Aseptic technique was maintained throughout treatment.\\n-The patient tolerated the procedure well w/o incident. \\n\\n-Additional product (hyaluronic ) may be necessary for optimal correction following treatment and/or maintenance. \\n-Instructed to avoid NSAIDS, Vitamin E, Fish Oil, Ginkgo Balboa, and alcohol 5-7 days before filler treatment.\\n\\n-Any remaining product was discarded.\\n-Ice provided post treatment for 2 to 5 minutes and or to take home. Patient instructed to apply ice 20 minutes on, 20 minutes off while awake for one to 1 to 2 days to reduce swelling. Avoid massage in the area. An over-the-counter antihistamine may be beneficial and was recommended to help with swelling.  Patient instructed to notify clinic if any bruising worsens, travels or becomes painful.

## 2019-10-01 NOTE — PROCEDURE: RESTYLANE SILK INJECTION
Post-Care Instructions: Ice provided post treatment for 2 to 5 minutes and or to take home. Patient instructed to apply ice 20 minutes on, 20 minutes off while awake for one to 1 to 2 days to reduce swelling. Avoid massage in the area. An over-the-counter antihistamine may be beneficial and was recommended to help with swelling.  Patient instructed to notify clinic if any bruising worsens, travels or becomes painful.

## 2019-10-01 NOTE — PROCEDURE: RESTYLANE SILK INJECTION
Price (Use Numbers Only, No Special Characters Or $): 387 Price (Use Numbers Only, No Special Characters Or $): 522

## 2019-10-02 ENCOUNTER — APPOINTMENT (OUTPATIENT)
Age: 34
Setting detail: DERMATOLOGY
End: 2019-10-06

## 2019-10-02 ENCOUNTER — HEALTH MAINTENANCE LETTER (OUTPATIENT)
Age: 34
End: 2019-10-02

## 2019-10-02 VITALS — WEIGHT: 153 LBS | HEIGHT: 66 IN | RESPIRATION RATE: 16 BRPM

## 2019-10-02 DIAGNOSIS — L82.0 INFLAMED SEBORRHEIC KERATOSIS: ICD-10-CM

## 2019-10-02 PROCEDURE — 17110 DESTRUCT B9 LESION 1-14: CPT

## 2019-10-02 PROCEDURE — OTHER LIQUID NITROGEN: OTHER

## 2019-10-02 ASSESSMENT — LOCATION DETAILED DESCRIPTION DERM: LOCATION DETAILED: LEFT LATERAL FOREHEAD

## 2019-10-02 ASSESSMENT — LOCATION SIMPLE DESCRIPTION DERM: LOCATION SIMPLE: LEFT FOREHEAD

## 2019-10-02 ASSESSMENT — LOCATION ZONE DERM: LOCATION ZONE: FACE

## 2019-10-02 NOTE — PROCEDURE: LIQUID NITROGEN
Include Z78.9 (Other Specified Conditions Influencing Health Status) As An Associated Diagnosis?: Yes
Add 52 Modifier (Optional): no
Consent: Verbal and written consent was obtained, and risks were reviewed prior to procedure today. Risks discussed include but are not limited to pain, crusting, scabbing, blistering, scarring, temporary or permanent darker or lighter pigmentary change, recurrence, incomplete resolution, and infection.
Medical Necessity Information: It is in your best interest to select a reason for this procedure from the list below. All of these items fulfill various CMS LCD requirements except the new and changing color options.
Post-Care Instructions: Avoid picking at any of the treated lesions. May cover with the a bandaid if blister unroofs or becomes a open wound.  Avoid going barefoot outside avoid any secondary infections.
Medical Necessity Clause: This procedure was medically necessary because the lesions that were treated were:
Detail Level: Detailed

## 2019-10-07 DIAGNOSIS — K21.9 GASTROESOPHAGEAL REFLUX DISEASE, ESOPHAGITIS PRESENCE NOT SPECIFIED: ICD-10-CM

## 2019-10-07 RX ORDER — PANTOPRAZOLE SODIUM 40 MG/1
40 TABLET, DELAYED RELEASE ORAL EVERY MORNING
Qty: 90 TABLET | Refills: 0 | Status: SHIPPED | OUTPATIENT
Start: 2019-10-07 | End: 2020-04-13

## 2019-10-07 NOTE — TELEPHONE ENCOUNTER
Faxed refill request from: Patient  Medication request: Pantoprazole 40 mg  Sig: Take 1 tablet by mouth every morning on an empty stomach  Last filled: 7/1/2019  Last Qty: 90  Pt's last office visit: 5/6/2019  Next scheduled office visit: None    Rx pended and routed to [facility] for review and approval if appropriate.    Madeline Nuñez LPN

## 2019-10-15 ENCOUNTER — APPOINTMENT (OUTPATIENT)
Age: 34
Setting detail: DERMATOLOGY
End: 2019-10-15

## 2019-10-15 DIAGNOSIS — Z41.9 ENCOUNTER FOR PROCEDURE FOR PURPOSES OTHER THAN REMEDYING HEALTH STATE, UNSPECIFIED: ICD-10-CM

## 2019-10-15 PROCEDURE — OTHER RESTYLANE SILK INJECTION: OTHER

## 2019-10-15 NOTE — PROCEDURE: RESTYLANE SILK INJECTION
Consent: Restylane Silk\\nLot Number: [id number]\\nExpiration Date: 01/31/22\\nCannula Used: 1\" 27G DermaSculpt cannula\\nSite(s): tear troughs\\nDepth of Injection: deep dermis, subdermal plane, and supra-periosteal plane\\n\\n-Treatment areas reviewed with patient holding a hand held mirror. The patient has realistic expectations.  \\n-Wrinkle assessment:  (“Darryl Aesthetics Scales”) Wrinkles= mild, Lines Present at Rest= mild, Folds= mild, Volume Loss & Skin Laxity=moderate. More medial fat protrusion on L lower eye compared to R side.\\n-Glogau wrinkle scale assessment :  II\\n-The patient was informed that the treatment area is considered off-label.\\n-Written consent obtained. Questions answered. Patient verbalized understanding of the content.  See written informed consent on file. \\n-Skin markings made and the skin was prepped with with alcohol and chlorhexadine prep. \\n-A 31g BD insulin syringe was back filled and also used to inject micro droplets of Restylane Silk in lateral tear troughs.\\n-Aseptic technique was maintained throughout treatment.\\n-The patient tolerated the procedure well w/o incident. \\n-Additional product (hyaluronic ) may be necessary for optimal correction following treatment and/or maintenance. \\n-Instructed to avoid NSAIDS, Vitamin E, Fish Oil, Ginkgo Balboa, and alcohol 5-7 days before filler treatment.\\n-Ice provided post treatment for 2 to 5 minutes and or to take home. Patient instructed to apply ice 20 minutes on, 20 minutes off while awake for one to 1 to 2 days to reduce swelling. Avoid massage in the area. An over-the-counter antihistamine may be beneficial and was recommended to help with swelling.  Patient instructed to notify clinic if any bruising worsens, travels or becomes painful. Consent: Restylane Silk\\nLot Number: [id number]\\nExpiration Date: 01/31/22\\nCannula Used: 1\" 27G DermaSculpt cannula\\nSite(s): tear troughs\\nDepth of Injection: deep dermis, subdermal plane, and supra-periosteal plane\\n\\n-Treatment areas reviewed with patient holding a hand held mirror. The patient has realistic expectations.  \\n-Wrinkle assessment:  (“Darryl Aesthetics Scales”) Wrinkles= mild, Lines Present at Rest= mild, Folds= mild, Volume Loss & Skin Laxity=moderate. More medial fat protrusion on L lower eye compared to R side.\\n-Glogau wrinkle scale assessment :  II\\n-The patient was informed that the treatment area is considered off-label.\\n-Written consent obtained. Questions answered. Patient verbalized understanding of the content.  See written informed consent on file. \\n-Skin markings made and the skin was prepped with with alcohol and chlorhexadine prep. \\n-A 31g BD insulin syringe was back filled and also used to inject micro droplets of Restylane Silk in lateral tear troughs.\\n-Aseptic technique was maintained throughout treatment.\\n-The patient tolerated the procedure well w/o incident. \\n-Additional product (hyaluronic ) may be necessary for optimal correction following treatment and/or maintenance. \\n-Instructed to avoid NSAIDS, Vitamin E, Fish Oil, Ginkgo Balboa, and alcohol 5-7 days before filler treatment.\\n-Ice provided post treatment for 2 to 5 minutes and or to take home. Patient instructed to apply ice 20 minutes on, 20 minutes off while awake for one to 1 to 2 days to reduce swelling. Avoid massage in the area. An over-the-counter antihistamine may be beneficial and was recommended to help with swelling.  Patient instructed to notify clinic if any bruising worsens, travels or becomes painful.

## 2019-10-15 NOTE — HPI: COSMETIC FOLLOW UP
How Did You Tolerate The Procedure?: well, without problems
What Condition Are We Treating?: treatment
What Procedure Did We Perform At The Last Visit?: Meng Rod 10/1/19

## 2019-11-26 ENCOUNTER — RX ONLY (RX ONLY)
Age: 34
End: 2019-11-26

## 2020-03-22 ENCOUNTER — HEALTH MAINTENANCE LETTER (OUTPATIENT)
Age: 35
End: 2020-03-22

## 2020-04-10 DIAGNOSIS — K21.9 GASTROESOPHAGEAL REFLUX DISEASE, ESOPHAGITIS PRESENCE NOT SPECIFIED: ICD-10-CM

## 2020-04-13 RX ORDER — PANTOPRAZOLE SODIUM 40 MG/1
40 TABLET, DELAYED RELEASE ORAL EVERY MORNING
Qty: 90 TABLET | Refills: 0 | Status: SHIPPED | OUTPATIENT
Start: 2020-04-13 | End: 2020-07-12

## 2020-07-01 ENCOUNTER — APPOINTMENT (OUTPATIENT)
Age: 35
Setting detail: DERMATOLOGY
End: 2020-07-06

## 2020-07-01 VITALS — RESPIRATION RATE: 14 BRPM | HEIGHT: 65 IN | WEIGHT: 150 LBS

## 2020-07-01 DIAGNOSIS — L73.8 OTHER SPECIFIED FOLLICULAR DISORDERS: ICD-10-CM

## 2020-07-01 DIAGNOSIS — L70.0 ACNE VULGARIS: ICD-10-CM

## 2020-07-01 PROCEDURE — OTHER PRESCRIPTION: OTHER

## 2020-07-01 PROCEDURE — OTHER COUNSELING: OTHER

## 2020-07-01 PROCEDURE — 99213 OFFICE O/P EST LOW 20 MIN: CPT

## 2020-07-01 PROCEDURE — OTHER ADDITIONAL NOTES: OTHER

## 2020-07-01 RX ORDER — DAPSONE 75 MG/G
7.5% GEL TOPICAL BID
Qty: 1 | Refills: 2 | Status: ERX | COMMUNITY
Start: 2020-07-01

## 2020-07-01 RX ORDER — SPIRONOLACTONE 100 MG/1
100 MG TABLET, FILM COATED ORAL QD
Qty: 90 | Refills: 4 | Status: ERX | COMMUNITY
Start: 2020-07-01

## 2020-07-01 ASSESSMENT — LOCATION DETAILED DESCRIPTION DERM: LOCATION DETAILED: LEFT CENTRAL MALAR CHEEK

## 2020-07-01 ASSESSMENT — LOCATION SIMPLE DESCRIPTION DERM: LOCATION SIMPLE: LEFT CHEEK

## 2020-07-01 ASSESSMENT — LOCATION ZONE DERM: LOCATION ZONE: FACE

## 2020-07-01 NOTE — PROCEDURE: ADDITIONAL NOTES
Detail Level: Simple
Additional Notes: Recommended using OTC differin at night for SGH prevention. Discussed that these can be treated with electrocautery- 14 for $100 or Vbeam laser at the spa. Recommended doing either of these in the fall due to sun exposure in the summer

## 2020-07-01 NOTE — PROCEDURE: COUNSELING
High Dose Vitamin A Pregnancy And Lactation Text: High dose vitamin A therapy is contraindicated during pregnancy and breast feeding.
Erythromycin Counseling:  I discussed with the patient the risks of erythromycin including but not limited to GI upset, allergic reaction, drug rash, diarrhea, increase in liver enzymes, and yeast infections.
Sarecycline Counseling: Patient advised regarding possible photosensitivity and discoloration of the teeth, skin, lips, tongue and gums.  Patient instructed to avoid sunlight, if possible.  When exposed to sunlight, patients should wear protective clothing, sunglasses, and sunscreen.  The patient was instructed to call the office immediately if the following severe adverse effects occur:  hearing changes, easy bruising/bleeding, severe headache, or vision changes.  The patient verbalized understanding of the proper use and possible adverse effects of sarecycline.  All of the patient's questions and concerns were addressed.
Spironolactone Pregnancy And Lactation Text: This medication can cause feminization of the male fetus and should be avoided during pregnancy. The active metabolite is also found in breast milk.
Detail Level: Zone
Doxycycline Counseling:  Patient counseled regarding possible photosensitivity and increased risk for sunburn.  Patient instructed to avoid sunlight, if possible.  When exposed to sunlight, patients should wear protective clothing, sunglasses, and sunscreen.  The patient was instructed to call the office immediately if the following severe adverse effects occur:  hearing changes, easy bruising/bleeding, severe headache, or vision changes.  The patient verbalized understanding of the proper use and possible adverse effects of doxycycline.  All of the patient's questions and concerns were addressed.
Dapsone Counseling: I discussed with the patient the risks of dapsone including but not limited to hemolytic anemia, agranulocytosis, rashes, methemoglobinemia, kidney failure, peripheral neuropathy, headaches, GI upset, and liver toxicity.  Patients who start dapsone require monitoring including baseline LFTs and weekly CBCs for the first month, then every month thereafter.  The patient verbalized understanding of the proper use and possible adverse effects of dapsone.  All of the patient's questions and concerns were addressed.
Birth Control Pills Counseling: Birth Control Pill Counseling: I discussed with the patient the potential side effects of OCPs including but not limited to increased risk of stroke, heart attack, thrombophlebitis, deep venous thrombosis, hepatic adenomas, breast changes, GI upset, headaches, and depression.  The patient verbalized understanding of the proper use and possible adverse effects of OCPs. All of the patient's questions and concerns were addressed.
Minocycline Counseling: Patient advised regarding possible photosensitivity and discoloration of the teeth, skin, lips, tongue and gums.  Patient instructed to avoid sunlight, if possible.  When exposed to sunlight, patients should wear protective clothing, sunglasses, and sunscreen.  The patient was instructed to call the office immediately if the following severe adverse effects occur:  hearing changes, easy bruising/bleeding, severe headache, or vision changes.  The patient verbalized understanding of the proper use and possible adverse effects of minocycline.  All of the patient's questions and concerns were addressed.
Tetracycline Pregnancy And Lactation Text: This medication is Pregnancy Category D and not consider safe during pregnancy. It is also excreted in breast milk.
Azithromycin Pregnancy And Lactation Text: This medication is considered safe during pregnancy and is also secreted in breast milk.
Benzoyl Peroxide Pregnancy And Lactation Text: This medication is Pregnancy Category C. It is unknown if benzoyl peroxide is excreted in breast milk.
Benzoyl Peroxide Counseling: Patient counseled that medicine may cause skin irritation and bleach clothing.  In the event of skin irritation, the patient was advised to reduce the amount of the drug applied or use it less frequently.   The patient verbalized understanding of the proper use and possible adverse effects of benzoyl peroxide.  All of the patient's questions and concerns were addressed.
Include Pregnancy/Lactation Warning?: No
Topical Retinoid Pregnancy And Lactation Text: This medication is Pregnancy Category C. It is unknown if this medication is excreted in breast milk.
Tazorac Pregnancy And Lactation Text: This medication is not safe during pregnancy. It is unknown if this medication is excreted in breast milk.
Tetracycline Counseling: Patient counseled regarding possible photosensitivity and increased risk for sunburn.  Patient instructed to avoid sunlight, if possible.  When exposed to sunlight, patients should wear protective clothing, sunglasses, and sunscreen.  The patient was instructed to call the office immediately if the following severe adverse effects occur:  hearing changes, easy bruising/bleeding, severe headache, or vision changes.  The patient verbalized understanding of the proper use and possible adverse effects of tetracycline.  All of the patient's questions and concerns were addressed. Patient understands to avoid pregnancy while on therapy due to potential birth defects.
High Dose Vitamin A Counseling: Side effects reviewed, pt to contact office should one occur.
Dapsone Pregnancy And Lactation Text: This medication is Pregnancy Category C and is not considered safe during pregnancy or breast feeding.
Spironolactone Counseling: Patient advised regarding risks of diarrhea, abdominal pain, hyperkalemia, birth defects (for female patients), liver toxicity and renal toxicity. The patient may need blood work to monitor liver and kidney function and potassium levels while on therapy. The patient verbalized understanding of the proper use and possible adverse effects of spironolactone.  All of the patient's questions and concerns were addressed.
Topical Sulfur Applications Counseling: Topical Sulfur Counseling: Patient counseled that this medication may cause skin irritation or allergic reactions.  In the event of skin irritation, the patient was advised to reduce the amount of the drug applied or use it less frequently.   The patient verbalized understanding of the proper use and possible adverse effects of topical sulfur application.  All of the patient's questions and concerns were addressed.
Birth Control Pills Pregnancy And Lactation Text: This medication should be avoided if pregnant and for the first 30 days post-partum.
Doxycycline Pregnancy And Lactation Text: This medication is Pregnancy Category D and not consider safe during pregnancy. It is also excreted in breast milk but is considered safe for shorter treatment courses.
Isotretinoin Pregnancy And Lactation Text: This medication is Pregnancy Category X and is considered extremely dangerous during pregnancy. It is unknown if it is excreted in breast milk.
Topical Clindamycin Counseling: Patient counseled that this medication may cause skin irritation or allergic reactions.  In the event of skin irritation, the patient was advised to reduce the amount of the drug applied or use it less frequently.   The patient verbalized understanding of the proper use and possible adverse effects of clindamycin.  All of the patient's questions and concerns were addressed.
Bactrim Counseling:  I discussed with the patient the risks of sulfa antibiotics including but not limited to GI upset, allergic reaction, drug rash, diarrhea, dizziness, photosensitivity, and yeast infections.  Rarely, more serious reactions can occur including but not limited to aplastic anemia, agranulocytosis, methemoglobinemia, blood dyscrasias, liver or kidney failure, lung infiltrates or desquamative/blistering drug rashes.
Topical Retinoid counseling:  Patient advised to apply a pea-sized amount only at bedtime and wait 30 minutes after washing their face before applying.  If too drying, patient may add a non-comedogenic moisturizer. The patient verbalized understanding of the proper use and possible adverse effects of retinoids.  All of the patient's questions and concerns were addressed.
Topical Sulfur Applications Pregnancy And Lactation Text: This medication is Pregnancy Category C and has an unknown safety profile during pregnancy. It is unknown if this topical medication is excreted in breast milk.
Erythromycin Pregnancy And Lactation Text: This medication is Pregnancy Category B and is considered safe during pregnancy. It is also excreted in breast milk.
Tazorac Counseling:  Patient advised that medication is irritating and drying.  Patient may need to apply sparingly and wash off after an hour before eventually leaving it on overnight.  The patient verbalized understanding of the proper use and possible adverse effects of tazorac.  All of the patient's questions and concerns were addressed.
Azithromycin Counseling:  I discussed with the patient the risks of azithromycin including but not limited to GI upset, allergic reaction, drug rash, diarrhea, and yeast infections.
Isotretinoin Counseling: Patient should get monthly blood tests, not donate blood, not drive at night if vision affected, not share medication, and not undergo elective surgery for 6 months after tx completed. Side effects reviewed, pt to contact office should one occur.
Topical Clindamycin Pregnancy And Lactation Text: This medication is Pregnancy Category B and is considered safe during pregnancy. It is unknown if it is excreted in breast milk.
Bactrim Pregnancy And Lactation Text: This medication is Pregnancy Category D and is known to cause fetal risk.  It is also excreted in breast milk.

## 2020-07-10 DIAGNOSIS — K21.9 GASTROESOPHAGEAL REFLUX DISEASE, ESOPHAGITIS PRESENCE NOT SPECIFIED: ICD-10-CM

## 2020-07-12 RX ORDER — PANTOPRAZOLE SODIUM 40 MG/1
40 TABLET, DELAYED RELEASE ORAL EVERY MORNING
Qty: 90 TABLET | Refills: 0 | Status: SHIPPED | OUTPATIENT
Start: 2020-07-12 | End: 2020-10-12

## 2020-09-14 ENCOUNTER — RX ONLY (RX ONLY)
Age: 35
End: 2020-09-14

## 2020-09-14 RX ORDER — VALACYCLOVIR HYDROCHLORIDE 1 G/1
1 G TABLET, FILM COATED ORAL TWICE A DAY
Qty: 20 | Refills: 1 | Status: ERX

## 2020-09-21 ENCOUNTER — APPOINTMENT (OUTPATIENT)
Dept: URBAN - METROPOLITAN AREA CLINIC 252 | Age: 35
Setting detail: DERMATOLOGY
End: 2020-09-21

## 2020-09-21 DIAGNOSIS — L73.8 OTHER SPECIFIED FOLLICULAR DISORDERS: ICD-10-CM

## 2020-09-21 PROCEDURE — OTHER COUNSELING: OTHER

## 2020-09-21 PROCEDURE — OTHER ELECTRODESICCATION (COSMETIC): OTHER

## 2020-09-21 ASSESSMENT — LOCATION SIMPLE DESCRIPTION DERM
LOCATION SIMPLE: LEFT CHEEK
LOCATION SIMPLE: LEFT FOREHEAD
LOCATION SIMPLE: RIGHT FOREHEAD
LOCATION SIMPLE: LEFT TEMPLE

## 2020-09-21 ASSESSMENT — LOCATION DETAILED DESCRIPTION DERM
LOCATION DETAILED: LEFT SUPERIOR FOREHEAD
LOCATION DETAILED: RIGHT LATERAL FOREHEAD
LOCATION DETAILED: LEFT INFERIOR TEMPLE
LOCATION DETAILED: LEFT LATERAL BUCCAL CHEEK
LOCATION DETAILED: RIGHT FOREHEAD
LOCATION DETAILED: RIGHT INFERIOR FOREHEAD

## 2020-09-21 ASSESSMENT — LOCATION ZONE DERM: LOCATION ZONE: FACE

## 2020-09-21 NOTE — PROCEDURE: ELECTRODESICCATION (COSMETIC)
Post-Care Instructions: - Patient was instructed to avoid picking at any of the treated lesions.\\n- Patient may apply Vaseline ointment to crusted or scabbing areas.
Length Of Topical Anesthesia Application (Optional): 15 minutes
Phillisp: 1
Consent: - Verbal and written consent was obtained, and risks were reviewed prior to procedure today. \\n- Risks discussed include but are not limited to pain, crusting, scabbing, scarring, temporary or permanent darker or lighter pigmentary change, recurrence, incomplete resolution, and infection.
Topical Anesthesia Type: BLT cream (benzocaine 10%, lidocaine 4%, tetracaine 2%)
Price (Use Numbers Only, No Special Characters Or $): 100
Detail Level: Detailed

## 2020-09-21 NOTE — PROCEDURE: COUNSELING
Patient Specific Counseling (Will Not Stick From Patient To Patient): - Discussed that treatment is available but not medically necessary. \\n- Patient requested treatment today.\\n- Advised these are likely to grow back at some point in the future.\\n- Patient expressed understanding.\\n- Discussed the cost prior to the procedure today and patient understands that payment must be made before leaving the clinic today.
Detail Level: Simple
Detail Level: Detailed

## 2020-10-10 DIAGNOSIS — K21.00 GASTROESOPHAGEAL REFLUX DISEASE WITH ESOPHAGITIS, UNSPECIFIED WHETHER HEMORRHAGE: Primary | ICD-10-CM

## 2020-10-12 NOTE — TELEPHONE ENCOUNTER
pantoprazole (PROTONIX) 40 MG EC tablet  Last Written Prescription Date:  7/12/2020  Last Fill Quantity: 90,   # refills: 0  Last Office Visit : 5/6/2019  Future Office visit: None  Routing refill request to provider for review/approval because:  Second request without office visit scheduled.  Refer to clinic for review      Sona Reese RN  Central Triage Red Flags/Med Refills

## 2020-10-13 RX ORDER — PANTOPRAZOLE SODIUM 40 MG/1
40 TABLET, DELAYED RELEASE ORAL EVERY MORNING
Qty: 30 TABLET | OUTPATIENT
Start: 2020-10-13

## 2021-01-12 DIAGNOSIS — K21.9 GASTROESOPHAGEAL REFLUX DISEASE, UNSPECIFIED WHETHER ESOPHAGITIS PRESENT: ICD-10-CM

## 2021-01-12 NOTE — TELEPHONE ENCOUNTER
pantoprazole (PROTONIX) 40 MG EC tablet     Last Written Prescription Date:  11/3/2020  Last Fill Quantity: 90,   # refills: 0  Last Office Visit : 5/6/2019  Future Office visit:  None    Routing refill request to provider for review/approval because:  Second Request      Over due office visit May 2019??     Refer to clinic for review       Sona Reese RN  Central Triage Red Flags/Med Refills

## 2021-01-15 ENCOUNTER — HEALTH MAINTENANCE LETTER (OUTPATIENT)
Age: 36
End: 2021-01-15

## 2021-02-08 RX ORDER — PANTOPRAZOLE SODIUM 40 MG/1
40 TABLET, DELAYED RELEASE ORAL EVERY MORNING
Qty: 30 TABLET | OUTPATIENT
Start: 2021-02-08

## 2021-02-08 NOTE — TELEPHONE ENCOUNTER
Patient overdue for follow up. Last seen in clinic in May 2019. Needs to schedule a follow up for further refills.

## 2021-02-08 NOTE — TELEPHONE ENCOUNTER
My chart message to patient advising to schedule an appointment for further refills.    Holley Kulkarni RN

## 2021-03-02 ENCOUNTER — APPOINTMENT (OUTPATIENT)
Dept: URBAN - METROPOLITAN AREA CLINIC 259 | Age: 36
Setting detail: DERMATOLOGY
End: 2021-03-02

## 2021-03-02 DIAGNOSIS — L57.8 OTHER SKIN CHANGES DUE TO CHRONIC EXPOSURE TO NONIONIZING RADIATION: ICD-10-CM

## 2021-03-02 DIAGNOSIS — D485 NEOPLASM OF UNCERTAIN BEHAVIOR OF SKIN: ICD-10-CM

## 2021-03-02 PROBLEM — D48.5 NEOPLASM OF UNCERTAIN BEHAVIOR OF SKIN: Status: ACTIVE | Noted: 2021-03-02

## 2021-03-02 PROCEDURE — OTHER BIOPSY BY SHAVE METHOD: OTHER

## 2021-03-02 PROCEDURE — OTHER COUNSELING: OTHER

## 2021-03-02 PROCEDURE — 11102 TANGNTL BX SKIN SINGLE LES: CPT

## 2021-03-02 PROCEDURE — 99212 OFFICE O/P EST SF 10 MIN: CPT | Mod: 25

## 2021-03-02 ASSESSMENT — LOCATION ZONE DERM
LOCATION ZONE: NECK
LOCATION ZONE: FACE
LOCATION ZONE: TRUNK

## 2021-03-02 ASSESSMENT — LOCATION SIMPLE DESCRIPTION DERM
LOCATION SIMPLE: RIGHT CHEEK
LOCATION SIMPLE: UPPER BACK
LOCATION SIMPLE: RIGHT ANTERIOR NECK

## 2021-03-02 ASSESSMENT — LOCATION DETAILED DESCRIPTION DERM
LOCATION DETAILED: RIGHT INFERIOR ANTERIOR NECK
LOCATION DETAILED: SUPERIOR THORACIC SPINE
LOCATION DETAILED: RIGHT MEDIAL BUCCAL CHEEK
LOCATION DETAILED: RIGHT INFERIOR CENTRAL MALAR CHEEK

## 2021-03-02 NOTE — PROCEDURE: BIOPSY BY SHAVE METHOD
Silver Nitrate Text: The wound bed was treated with silver nitrate after the biopsy was performed.
Dressing: bandage
Billing Type: Third-Party Bill
Additional Anesthesia Volume In Cc (Will Not Render If 0): 0
Validate Triangulation: No
Depth Of Biopsy: dermis
Electrodesiccation And Curettage Text: The wound bed was treated with electrodesiccation and curettage after the biopsy was performed.
Detail Level: Simple
Hemostasis: Drysol
Render Post-Care Instructions In Note?: yes
Electrodesiccation Text: The wound bed was treated with electrodesiccation after the biopsy was performed.
Cryotherapy Text: The wound bed was treated with cryotherapy after the biopsy was performed.
Notification Instructions: Patient will be notified of biopsy results. However, patient instructed to call the office if not contacted within 2 weeks.
Consent: Written consent was obtained and risks were reviewed including but not limited to scarring, infection, bleeding, scabbing, incomplete removal, nerve damage and allergy to anesthesia.
Information: Selecting Yes will display possible errors in your note based on the variables you have selected. This validation is only offered as a suggestion for you. PLEASE NOTE THAT THE VALIDATION TEXT WILL BE REMOVED WHEN YOU FINALIZE YOUR NOTE. IF YOU WANT TO FAX A PRELIMINARY NOTE YOU WILL NEED TO TOGGLE THIS TO 'NO' IF YOU DO NOT WANT IT IN YOUR FAXED NOTE.
Type Of Destruction Used: Curettage
Biopsy Type: H and E
Curettage Text: The wound bed was treated with curettage after the biopsy was performed.
Wound Care: Petrolatum
Post-Care Instructions: I reviewed with the patient in detail post-care instructions. Patient is to keep the biopsy site dry overnight, and then apply bacitracin twice daily until healed. Patient may apply hydrogen peroxide soaks to remove any crusting.
Anesthesia Volume In Cc (Will Not Render If 0): 0.5
Biopsy Method: Dermablade
Anesthesia Type: 1% lidocaine with epinephrine

## 2021-03-16 ENCOUNTER — APPOINTMENT (OUTPATIENT)
Dept: URBAN - METROPOLITAN AREA CLINIC 259 | Age: 36
Setting detail: DERMATOLOGY
End: 2021-03-16

## 2021-03-16 DIAGNOSIS — L73.8 OTHER SPECIFIED FOLLICULAR DISORDERS: ICD-10-CM

## 2021-03-16 PROCEDURE — 99212 OFFICE O/P EST SF 10 MIN: CPT

## 2021-03-16 PROCEDURE — OTHER COUNSELING: OTHER

## 2021-03-16 ASSESSMENT — LOCATION SIMPLE DESCRIPTION DERM: LOCATION SIMPLE: RIGHT FOREHEAD

## 2021-03-16 ASSESSMENT — LOCATION ZONE DERM: LOCATION ZONE: FACE

## 2021-03-16 ASSESSMENT — LOCATION DETAILED DESCRIPTION DERM: LOCATION DETAILED: RIGHT INFERIOR MEDIAL FOREHEAD

## 2021-03-16 NOTE — PROCEDURE: COUNSELING
Detail Level: Detailed
Patient Specific Counseling (Will Not Stick From Patient To Patient): \\n15 lesions were treated with electrodesication, BLT applied 15 min prior to procedure

## 2021-04-14 ENCOUNTER — APPOINTMENT (OUTPATIENT)
Dept: URBAN - METROPOLITAN AREA CLINIC 259 | Age: 36
Setting detail: DERMATOLOGY
End: 2021-04-14

## 2021-04-14 DIAGNOSIS — L73.8 OTHER SPECIFIED FOLLICULAR DISORDERS: ICD-10-CM

## 2021-04-14 DIAGNOSIS — L72.8 OTHER FOLLICULAR CYSTS OF THE SKIN AND SUBCUTANEOUS TISSUE: ICD-10-CM

## 2021-04-14 PROCEDURE — 99212 OFFICE O/P EST SF 10 MIN: CPT | Mod: 25

## 2021-04-14 PROCEDURE — 11900 INJECT SKIN LESIONS </W 7: CPT

## 2021-04-14 PROCEDURE — OTHER COUNSELING: OTHER

## 2021-04-14 PROCEDURE — OTHER INTRALESIONAL KENALOG: OTHER

## 2021-04-14 ASSESSMENT — LOCATION ZONE DERM: LOCATION ZONE: FACE

## 2021-04-14 ASSESSMENT — LOCATION DETAILED DESCRIPTION DERM
LOCATION DETAILED: RIGHT CHIN
LOCATION DETAILED: RIGHT INFERIOR MEDIAL FOREHEAD

## 2021-04-14 ASSESSMENT — LOCATION SIMPLE DESCRIPTION DERM
LOCATION SIMPLE: CHIN
LOCATION SIMPLE: RIGHT FOREHEAD

## 2021-04-14 NOTE — PROCEDURE: INTRALESIONAL KENALOG
Detail Level: Simple
Kenalog Preparation: Kenalog
Include Z78.9 (Other Specified Conditions Influencing Health Status) As An Associated Diagnosis?: No
Consent: The risks of atrophy were reviewed with the patient.
Concentration Of Solution Injected (Mg/Ml): 2.5
Total Volume Injected (Ccs- Only Use Numbers And Decimals): 0.1
Medical Necessity Clause: This procedure was medically necessary because the lesions that were treated were:
X Size Of Lesion In Cm (Optional): 0

## 2021-06-30 ENCOUNTER — APPOINTMENT (OUTPATIENT)
Dept: URBAN - METROPOLITAN AREA CLINIC 259 | Age: 36
Setting detail: DERMATOLOGY
End: 2021-06-30

## 2021-06-30 DIAGNOSIS — L57.8 OTHER SKIN CHANGES DUE TO CHRONIC EXPOSURE TO NONIONIZING RADIATION: ICD-10-CM

## 2021-06-30 DIAGNOSIS — L73.8 OTHER SPECIFIED FOLLICULAR DISORDERS: ICD-10-CM

## 2021-06-30 PROCEDURE — OTHER MIPS QUALITY: OTHER

## 2021-06-30 PROCEDURE — 99212 OFFICE O/P EST SF 10 MIN: CPT

## 2021-06-30 PROCEDURE — OTHER COUNSELING: OTHER

## 2021-06-30 PROCEDURE — OTHER BENIGN DESTRUCTION: OTHER

## 2021-06-30 ASSESSMENT — LOCATION ZONE DERM
LOCATION ZONE: FACE
LOCATION ZONE: TRUNK

## 2021-06-30 ASSESSMENT — LOCATION DETAILED DESCRIPTION DERM
LOCATION DETAILED: LEFT SUPERIOR MEDIAL UPPER BACK
LOCATION DETAILED: RIGHT CENTRAL MALAR CHEEK

## 2021-06-30 ASSESSMENT — LOCATION SIMPLE DESCRIPTION DERM
LOCATION SIMPLE: LEFT UPPER BACK
LOCATION SIMPLE: RIGHT CHEEK

## 2021-06-30 NOTE — PROCEDURE: MIPS QUALITY
Quality 130: Documentation Of Current Medications In The Medical Record: Current Medications Documented
Detail Level: Detailed
Quality 226: Preventive Care And Screening: Tobacco Use: Screening And Cessation Intervention: Patient screened for tobacco use and is an ex/non-smoker
Quality 110: Preventive Care And Screening: Influenza Immunization: Influenza Immunization Ordered or Recommended, but not Administered due to system reason
Quality 431: Preventive Care And Screening: Unhealthy Alcohol Use - Screening: Patient screened for unhealthy alcohol use using a single question and scores less than 2 times per year

## 2021-06-30 NOTE — PROCEDURE: BENIGN DESTRUCTION
Post-Care Instructions: I reviewed with the patient in detail post-care instructions. Patient is to wear sunprotection, and avoid picking at any of the treated lesions. Pt may apply Vaseline to crusted or scabbing areas.
Medical Necessity Clause: This procedure was medically necessary because the lesions that were treated were:
Include Z78.9 (Other Specified Conditions Influencing Health Status) As An Associated Diagnosis?: No
Detail Level: Zone
Total Number Of Lesions Treated: 10
Medical Necessity Information: It is in your best interest to select a reason for this procedure from the list below. All of these items fulfill various CMS LCD requirements except the new and changing color options.
Anesthesia Volume In Cc: 0
Consent: The patient's consent was obtained including but not limited to risks of crusting, scabbing, blistering, scarring, darker or lighter pigmentary change, recurrence, incomplete removal and infection.

## 2021-07-23 ENCOUNTER — APPOINTMENT (OUTPATIENT)
Dept: URBAN - METROPOLITAN AREA CLINIC 259 | Age: 36
Setting detail: DERMATOLOGY
End: 2021-07-23

## 2021-07-23 DIAGNOSIS — Z41.9 ENCOUNTER FOR PROCEDURE FOR PURPOSES OTHER THAN REMEDYING HEALTH STATE, UNSPECIFIED: ICD-10-CM

## 2021-07-23 PROCEDURE — OTHER HYDRAFACIAL: OTHER

## 2021-07-23 ASSESSMENT — LOCATION ZONE DERM: LOCATION ZONE: FACE

## 2021-07-23 ASSESSMENT — LOCATION SIMPLE DESCRIPTION DERM: LOCATION SIMPLE: INFERIOR FOREHEAD

## 2021-07-23 ASSESSMENT — LOCATION DETAILED DESCRIPTION DERM: LOCATION DETAILED: INFERIOR MID FOREHEAD

## 2021-07-23 NOTE — PROCEDURE: HYDRAFACIAL
Additional Vacuum Pressure (Won't Render If 0): 0
Consent: Written consent obtained, risks reviewed including but not limited to crusting, scabbing, blistering, scarring, darker or lighter pigmentary change, bruising, and/or incomplete response.
Treatment Number: 1
Glycolic Acid %: 7.5%
Number Of Passes: 2
Indication: acne
Post-Care Instructions: I reviewed with the patient in detail post-care instructions. Patient should stay away from the sun and wear sun protection until treated areas are fully healed.
Detail Level: Zone
Vacuum Pressure: 14
Price (Use Numbers Only, No Special Characters Or $): 128

## 2021-08-25 ENCOUNTER — APPOINTMENT (OUTPATIENT)
Dept: URBAN - METROPOLITAN AREA CLINIC 257 | Age: 36
Setting detail: DERMATOLOGY
End: 2021-08-25

## 2021-08-25 DIAGNOSIS — L70.0 ACNE VULGARIS: ICD-10-CM

## 2021-08-25 PROCEDURE — OTHER COUNSELING: OTHER

## 2021-08-25 PROCEDURE — OTHER PRESCRIPTION: OTHER

## 2021-08-25 PROCEDURE — OTHER PRESCRIPTION MEDICATION MANAGEMENT: OTHER

## 2021-08-25 PROCEDURE — OTHER MIPS QUALITY: OTHER

## 2021-08-25 PROCEDURE — 99213 OFFICE O/P EST LOW 20 MIN: CPT

## 2021-08-25 RX ORDER — AZELAIC ACID 0.15 G/G
AEROSOL, FOAM TOPICAL
Qty: 1 | Refills: 5 | Status: ERX | COMMUNITY
Start: 2021-08-25

## 2021-08-25 RX ORDER — SPIRONOLACTONE 100 MG/1
TABLET, FILM COATED ORAL
Qty: 180 | Refills: 3 | Status: ERX | COMMUNITY
Start: 2021-08-25

## 2021-08-25 RX ORDER — DAPSONE 75 MG/G
GEL TOPICAL
Qty: 1 | Refills: 5 | Status: ERX | COMMUNITY
Start: 2021-08-25

## 2021-08-25 ASSESSMENT — LOCATION SIMPLE DESCRIPTION DERM: LOCATION SIMPLE: LEFT FOREHEAD

## 2021-08-25 ASSESSMENT — LOCATION ZONE DERM: LOCATION ZONE: FACE

## 2021-08-25 ASSESSMENT — LOCATION DETAILED DESCRIPTION DERM: LOCATION DETAILED: LEFT INFERIOR MEDIAL FOREHEAD

## 2021-08-25 NOTE — PROCEDURE: PRESCRIPTION MEDICATION MANAGEMENT
Detail Level: Zone
Continue Regimen: Aczone 7.5% gel once daily at bedtime, spironolactone 100mg BID
Initiate Treatment: Finacea foam once daily  in the morning
Plan: Will follow up in two months and have her drop spironolactone down to 100mg daily
Render In Strict Bullet Format?: No

## 2021-08-25 NOTE — PROCEDURE: COUNSELING
Topical Sulfur Applications Pregnancy And Lactation Text: This medication is Pregnancy Category C and has an unknown safety profile during pregnancy. It is unknown if this topical medication is excreted in breast milk.
Sarecycline Pregnancy And Lactation Text: This medication is Pregnancy Category D and not consider safe during pregnancy. It is also excreted in breast milk.
Doxycycline Pregnancy And Lactation Text: This medication is Pregnancy Category D and not consider safe during pregnancy. It is also excreted in breast milk but is considered safe for shorter treatment courses.
Topical Retinoid counseling:  Patient advised to apply a pea-sized amount only at bedtime and wait 30 minutes after washing their face before applying.  If too drying, patient may add a non-comedogenic moisturizer. The patient verbalized understanding of the proper use and possible adverse effects of retinoids.  All of the patient's questions and concerns were addressed.
Dapsone Pregnancy And Lactation Text: This medication is Pregnancy Category C and is not considered safe during pregnancy or breast feeding.
Detail Level: Zone
Birth Control Pills Pregnancy And Lactation Text: This medication should be avoided if pregnant and for the first 30 days post-partum.
Isotretinoin Counseling: Patient should get monthly blood tests, not donate blood, not drive at night if vision affected, not share medication, and not undergo elective surgery for 6 months after tx completed. Side effects reviewed, pt to contact office should one occur.
Minocycline Counseling: Patient advised regarding possible photosensitivity and discoloration of the teeth, skin, lips, tongue and gums.  Patient instructed to avoid sunlight, if possible.  When exposed to sunlight, patients should wear protective clothing, sunglasses, and sunscreen.  The patient was instructed to call the office immediately if the following severe adverse effects occur:  hearing changes, easy bruising/bleeding, severe headache, or vision changes.  The patient verbalized understanding of the proper use and possible adverse effects of minocycline.  All of the patient's questions and concerns were addressed.
Dapsone Counseling: I discussed with the patient the risks of dapsone including but not limited to hemolytic anemia, agranulocytosis, rashes, methemoglobinemia, kidney failure, peripheral neuropathy, headaches, GI upset, and liver toxicity.  Patients who start dapsone require monitoring including baseline LFTs and weekly CBCs for the first month, then every month thereafter.  The patient verbalized understanding of the proper use and possible adverse effects of dapsone.  All of the patient's questions and concerns were addressed.
Erythromycin Pregnancy And Lactation Text: This medication is Pregnancy Category B and is considered safe during pregnancy. It is also excreted in breast milk.
Birth Control Pills Counseling: Birth Control Pill Counseling: I discussed with the patient the potential side effects of OCPs including but not limited to increased risk of stroke, heart attack, thrombophlebitis, deep venous thrombosis, hepatic adenomas, breast changes, GI upset, headaches, and depression.  The patient verbalized understanding of the proper use and possible adverse effects of OCPs. All of the patient's questions and concerns were addressed.
Spironolactone Counseling: Patient advised regarding risks of diarrhea, abdominal pain, hyperkalemia, birth defects (for female patients), liver toxicity and renal toxicity. The patient may need blood work to monitor liver and kidney function and potassium levels while on therapy. The patient verbalized understanding of the proper use and possible adverse effects of spironolactone.  All of the patient's questions and concerns were addressed.
Sarecycline Counseling: Patient advised regarding possible photosensitivity and discoloration of the teeth, skin, lips, tongue and gums.  Patient instructed to avoid sunlight, if possible.  When exposed to sunlight, patients should wear protective clothing, sunglasses, and sunscreen.  The patient was instructed to call the office immediately if the following severe adverse effects occur:  hearing changes, easy bruising/bleeding, severe headache, or vision changes.  The patient verbalized understanding of the proper use and possible adverse effects of sarecycline.  All of the patient's questions and concerns were addressed.
Spironolactone Pregnancy And Lactation Text: This medication can cause feminization of the male fetus and should be avoided during pregnancy. The active metabolite is also found in breast milk.
Bactrim Counseling:  I discussed with the patient the risks of sulfa antibiotics including but not limited to GI upset, allergic reaction, drug rash, diarrhea, dizziness, photosensitivity, and yeast infections.  Rarely, more serious reactions can occur including but not limited to aplastic anemia, agranulocytosis, methemoglobinemia, blood dyscrasias, liver or kidney failure, lung infiltrates or desquamative/blistering drug rashes.
Tazorac Counseling:  Patient advised that medication is irritating and drying.  Patient may need to apply sparingly and wash off after an hour before eventually leaving it on overnight.  The patient verbalized understanding of the proper use and possible adverse effects of tazorac.  All of the patient's questions and concerns were addressed.
Azithromycin Counseling:  I discussed with the patient the risks of azithromycin including but not limited to GI upset, allergic reaction, drug rash, diarrhea, and yeast infections.
Topical Clindamycin Pregnancy And Lactation Text: This medication is Pregnancy Category B and is considered safe during pregnancy. It is unknown if it is excreted in breast milk.
Use Enhanced Medication Counseling?: No
Topical Clindamycin Counseling: Patient counseled that this medication may cause skin irritation or allergic reactions.  In the event of skin irritation, the patient was advised to reduce the amount of the drug applied or use it less frequently.   The patient verbalized understanding of the proper use and possible adverse effects of clindamycin.  All of the patient's questions and concerns were addressed.
Azithromycin Pregnancy And Lactation Text: This medication is considered safe during pregnancy and is also secreted in breast milk.
High Dose Vitamin A Counseling: Side effects reviewed, pt to contact office should one occur.
Topical Retinoid Pregnancy And Lactation Text: This medication is Pregnancy Category C. It is unknown if this medication is excreted in breast milk.
Tazorac Pregnancy And Lactation Text: This medication is not safe during pregnancy. It is unknown if this medication is excreted in breast milk.
Topical Sulfur Applications Counseling: Topical Sulfur Counseling: Patient counseled that this medication may cause skin irritation or allergic reactions.  In the event of skin irritation, the patient was advised to reduce the amount of the drug applied or use it less frequently.   The patient verbalized understanding of the proper use and possible adverse effects of topical sulfur application.  All of the patient's questions and concerns were addressed.
High Dose Vitamin A Pregnancy And Lactation Text: High dose vitamin A therapy is contraindicated during pregnancy and breast feeding.
Isotretinoin Pregnancy And Lactation Text: This medication is Pregnancy Category X and is considered extremely dangerous during pregnancy. It is unknown if it is excreted in breast milk.
Doxycycline Counseling:  Patient counseled regarding possible photosensitivity and increased risk for sunburn.  Patient instructed to avoid sunlight, if possible.  When exposed to sunlight, patients should wear protective clothing, sunglasses, and sunscreen.  The patient was instructed to call the office immediately if the following severe adverse effects occur:  hearing changes, easy bruising/bleeding, severe headache, or vision changes.  The patient verbalized understanding of the proper use and possible adverse effects of doxycycline.  All of the patient's questions and concerns were addressed.
Bactrim Pregnancy And Lactation Text: This medication is Pregnancy Category D and is known to cause fetal risk.  It is also excreted in breast milk.
Benzoyl Peroxide Pregnancy And Lactation Text: This medication is Pregnancy Category C. It is unknown if benzoyl peroxide is excreted in breast milk.
Tetracycline Counseling: Patient counseled regarding possible photosensitivity and increased risk for sunburn.  Patient instructed to avoid sunlight, if possible.  When exposed to sunlight, patients should wear protective clothing, sunglasses, and sunscreen.  The patient was instructed to call the office immediately if the following severe adverse effects occur:  hearing changes, easy bruising/bleeding, severe headache, or vision changes.  The patient verbalized understanding of the proper use and possible adverse effects of tetracycline.  All of the patient's questions and concerns were addressed. Patient understands to avoid pregnancy while on therapy due to potential birth defects.
Benzoyl Peroxide Counseling: Patient counseled that medicine may cause skin irritation and bleach clothing.  In the event of skin irritation, the patient was advised to reduce the amount of the drug applied or use it less frequently.   The patient verbalized understanding of the proper use and possible adverse effects of benzoyl peroxide.  All of the patient's questions and concerns were addressed.
Erythromycin Counseling:  I discussed with the patient the risks of erythromycin including but not limited to GI upset, allergic reaction, drug rash, diarrhea, increase in liver enzymes, and yeast infections.
Patient Specific Counseling (Will Not Stick From Patient To Patient): She has increased her spironolactone to BID and has been having irregular mensus.  We will drop her back to q day and add in the finacea.  We will see her back in 2 months.

## 2021-08-25 NOTE — HPI: PIMPLES (ACNE)
What Type Of Note Output Would You Prefer (Optional)?: Bullet Format
How Severe Is Your Acne?: mild
Is This A New Presentation, Or A Follow-Up?: Acne
Additional Comments (Use Complete Sentences): Patient presents today for acne follow up. She is currently using aczone 7.5% and spironolactone 100 mg BID. She discontinued her birth control approximately 1 year ago. She states she is still having some breaking out, she requests refills today and would like to discuss ongoing treatment options

## 2021-08-30 ENCOUNTER — RX ONLY (RX ONLY)
Age: 36
End: 2021-08-30

## 2021-08-30 RX ORDER — ADAPALENE AND BENZOYL PEROXIDE 3; 25 MG/G; MG/G
GEL TOPICAL
Qty: 1 | Refills: 3 | Status: ERX | COMMUNITY
Start: 2021-08-30

## 2021-09-04 ENCOUNTER — HEALTH MAINTENANCE LETTER (OUTPATIENT)
Age: 36
End: 2021-09-04

## 2021-09-13 ENCOUNTER — RX ONLY (RX ONLY)
Age: 36
End: 2021-09-13

## 2021-09-13 RX ORDER — ADAPALENE AND BENZOYL PEROXIDE 3; 25 MG/G; MG/G
GEL TOPICAL
Qty: 1 | Refills: 3 | Status: ERX

## 2021-09-14 ENCOUNTER — RX ONLY (RX ONLY)
Age: 36
End: 2021-09-14

## 2021-09-14 RX ORDER — DAPSONE 50 MG/G
GEL TOPICAL
Qty: 60 | Refills: 5 | Status: ERX | COMMUNITY
Start: 2021-09-14

## 2022-02-19 ENCOUNTER — HEALTH MAINTENANCE LETTER (OUTPATIENT)
Age: 37
End: 2022-02-19

## 2022-03-04 ENCOUNTER — APPOINTMENT (OUTPATIENT)
Dept: URBAN - METROPOLITAN AREA CLINIC 259 | Age: 37
Setting detail: DERMATOLOGY
End: 2022-03-04

## 2022-03-04 DIAGNOSIS — L73.8 OTHER SPECIFIED FOLLICULAR DISORDERS: ICD-10-CM

## 2022-03-04 PROCEDURE — OTHER COUNSELING: OTHER

## 2022-03-04 PROCEDURE — OTHER MIPS QUALITY: OTHER

## 2022-03-04 PROCEDURE — 17110 DESTRUCT B9 LESION 1-14: CPT

## 2022-03-04 PROCEDURE — OTHER ELECTRODESICCATION: OTHER

## 2022-03-04 ASSESSMENT — LOCATION SIMPLE DESCRIPTION DERM
LOCATION SIMPLE: LEFT CHEEK
LOCATION SIMPLE: RIGHT FOREHEAD
LOCATION SIMPLE: LEFT FOREHEAD
LOCATION SIMPLE: RIGHT CHEEK

## 2022-03-04 ASSESSMENT — LOCATION DETAILED DESCRIPTION DERM
LOCATION DETAILED: RIGHT INFERIOR MEDIAL FOREHEAD
LOCATION DETAILED: LEFT LATERAL MALAR CHEEK
LOCATION DETAILED: LEFT FOREHEAD
LOCATION DETAILED: RIGHT LATERAL MALAR CHEEK
LOCATION DETAILED: LEFT INFERIOR LATERAL FOREHEAD
LOCATION DETAILED: LEFT INFERIOR CENTRAL MALAR CHEEK
LOCATION DETAILED: RIGHT FOREHEAD
LOCATION DETAILED: RIGHT INFERIOR CENTRAL MALAR CHEEK
LOCATION DETAILED: RIGHT INFERIOR FOREHEAD
LOCATION DETAILED: LEFT INFERIOR FOREHEAD

## 2022-03-04 ASSESSMENT — LOCATION ZONE DERM: LOCATION ZONE: FACE

## 2022-03-04 NOTE — PROCEDURE: ELECTRODESICCATION
Include Z78.9 (Other Specified Conditions Influencing Health Status) As An Associated Diagnosis?: No
Medical Necessity Information: It is in your best interest to select a reason for this procedure from the list below. All of these items fulfill various CMS LCD requirements except the new and changing color options.
Consent: The patient's consent was obtained including but not limited to risks of crusting, scabbing, blistering, scarring, darker or lighter pigmentary change, recurrence, incomplete removal and infection.
Anesthesia Type: 1% lidocaine with epinephrine
Medical Necessity Clause: This procedure was medically necessary because the lesions that were treated were:
Detail Level: Simple
Post-Care Instructions: I reviewed with the patient in detail post-care instructions. Patient is to wear sunprotection, and avoid picking at any of the treated lesions. Pt may apply Vaseline to crusted or scabbing areas

## 2022-03-04 NOTE — PROCEDURE: MIPS QUALITY
Quality 431: Preventive Care And Screening: Unhealthy Alcohol Use - Screening: Patient screened for unhealthy alcohol use using a single question and scores less than 2 times per year
Quality 130: Documentation Of Current Medications In The Medical Record: Current Medications Documented
Quality 226: Preventive Care And Screening: Tobacco Use: Screening And Cessation Intervention: Patient screened for tobacco use and is an ex/non-smoker
Quality 110: Preventive Care And Screening: Influenza Immunization: Influenza Immunization Ordered or Recommended, but not Administered due to system reason
Detail Level: Detailed

## 2022-05-11 ENCOUNTER — RX ONLY (RX ONLY)
Age: 37
End: 2022-05-11

## 2022-05-11 RX ORDER — DAPSONE 50 MG/G
GEL TOPICAL
Qty: 60 | Refills: 5 | Status: CANCELLED

## 2022-07-06 ENCOUNTER — RX ONLY (RX ONLY)
Age: 37
End: 2022-07-06

## 2022-07-06 RX ORDER — ADAPALENE AND BENZOYL PEROXIDE 1; 25 MG/G; MG/G
GEL TOPICAL
Qty: 45 | Refills: 2 | Status: CANCELLED | COMMUNITY
Start: 2022-07-06

## 2022-07-06 RX ORDER — DAPSONE 75 MG/G
GEL TOPICAL
Qty: 60 | Refills: 2 | Status: ERX

## 2022-07-07 ENCOUNTER — RX ONLY (RX ONLY)
Age: 37
End: 2022-07-07

## 2022-07-07 RX ORDER — ADAPALENE AND BENZOYL PEROXIDE 1; 25 MG/G; MG/G
GEL TOPICAL
Qty: 45 | Refills: 1 | Status: ERX

## 2022-10-22 ENCOUNTER — HEALTH MAINTENANCE LETTER (OUTPATIENT)
Age: 37
End: 2022-10-22

## 2023-01-11 ENCOUNTER — RX ONLY (RX ONLY)
Age: 38
End: 2023-01-11

## 2023-01-11 RX ORDER — SPIRONOLACTONE 100 MG/1
TABLET, FILM COATED ORAL
Qty: 30 | Refills: 0 | Status: ERX

## 2023-01-19 ENCOUNTER — APPOINTMENT (OUTPATIENT)
Dept: URBAN - METROPOLITAN AREA CLINIC 257 | Age: 38
Setting detail: DERMATOLOGY
End: 2023-01-19

## 2023-01-19 DIAGNOSIS — L57.8 OTHER SKIN CHANGES DUE TO CHRONIC EXPOSURE TO NONIONIZING RADIATION: ICD-10-CM

## 2023-01-19 DIAGNOSIS — L70.0 ACNE VULGARIS: ICD-10-CM

## 2023-01-19 PROCEDURE — OTHER ADDITIONAL NOTES: OTHER

## 2023-01-19 PROCEDURE — OTHER MIPS QUALITY: OTHER

## 2023-01-19 PROCEDURE — OTHER PRESCRIPTION: OTHER

## 2023-01-19 PROCEDURE — OTHER SUNSCREEN RECOMMENDATIONS: OTHER

## 2023-01-19 PROCEDURE — OTHER COUNSELING: OTHER

## 2023-01-19 PROCEDURE — 99213 OFFICE O/P EST LOW 20 MIN: CPT

## 2023-01-19 RX ORDER — SPIRONOLACTONE 50 MG/1
TABLET, FILM COATED ORAL
Qty: 90 | Refills: 3 | Status: ERX | COMMUNITY
Start: 2023-01-19

## 2023-01-19 ASSESSMENT — LOCATION SIMPLE DESCRIPTION DERM: LOCATION SIMPLE: LEFT FOREHEAD

## 2023-01-19 ASSESSMENT — LOCATION DETAILED DESCRIPTION DERM: LOCATION DETAILED: LEFT INFERIOR MEDIAL FOREHEAD

## 2023-01-19 ASSESSMENT — LOCATION ZONE DERM: LOCATION ZONE: FACE

## 2023-01-19 NOTE — PROCEDURE: COUNSELING
Patient Specific Counseling (Will Not Stick From Patient To Patient): She has increased her spironolactone to BID and has been having irregular mensus.  We will drop her back to q day and add in the finacea.  We will see her back in 2 months.
Topical Clindamycin Pregnancy And Lactation Text: This medication is Pregnancy Category B and is considered safe during pregnancy. It is unknown if it is excreted in breast milk.
Minocycline Pregnancy And Lactation Text: This medication is Pregnancy Category D and not consider safe during pregnancy. It is also excreted in breast milk.
Dapsone Counseling: I discussed with the patient the risks of dapsone including but not limited to hemolytic anemia, agranulocytosis, rashes, methemoglobinemia, kidney failure, peripheral neuropathy, headaches, GI upset, and liver toxicity.  Patients who start dapsone require monitoring including baseline LFTs and weekly CBCs for the first month, then every month thereafter.  The patient verbalized understanding of the proper use and possible adverse effects of dapsone.  All of the patient's questions and concerns were addressed.
High Dose Vitamin A Counseling: Side effects reviewed, pt to contact office should one occur.
Azelaic Acid Pregnancy And Lactation Text: This medication is considered safe during pregnancy and breast feeding.
Azithromycin Counseling:  I discussed with the patient the risks of azithromycin including but not limited to GI upset, allergic reaction, drug rash, diarrhea, and yeast infections.
Topical Sulfur Applications Pregnancy And Lactation Text: This medication is Pregnancy Category C and has an unknown safety profile during pregnancy. It is unknown if this topical medication is excreted in breast milk.
Winlevi Pregnancy And Lactation Text: This medication is considered safe during pregnancy and breastfeeding.
Doxycycline Pregnancy And Lactation Text: This medication is Pregnancy Category D and not consider safe during pregnancy. It is also excreted in breast milk but is considered safe for shorter treatment courses.
Include Pregnancy/Lactation Warning?: No
Azelaic Acid Counseling: Patient counseled that medicine may cause skin irritation and to avoid applying near the eyes.  In the event of skin irritation, the patient was advised to reduce the amount of the drug applied or use it less frequently.   The patient verbalized understanding of the proper use and possible adverse effects of azelaic acid.  All of the patient's questions and concerns were addressed.
Spironolactone Pregnancy And Lactation Text: This medication can cause feminization of the male fetus and should be avoided during pregnancy. The active metabolite is also found in breast milk.
Detail Level: Generalized
Isotretinoin Pregnancy And Lactation Text: This medication is Pregnancy Category X and is considered extremely dangerous during pregnancy. It is unknown if it is excreted in breast milk.
Topical Retinoid Pregnancy And Lactation Text: This medication is Pregnancy Category C. It is unknown if this medication is excreted in breast milk.
Benzoyl Peroxide Pregnancy And Lactation Text: This medication is Pregnancy Category C. It is unknown if benzoyl peroxide is excreted in breast milk.
Erythromycin Pregnancy And Lactation Text: This medication is Pregnancy Category B and is considered safe during pregnancy. It is also excreted in breast milk.
Minocycline Counseling: Patient advised regarding possible photosensitivity and discoloration of the teeth, skin, lips, tongue and gums.  Patient instructed to avoid sunlight, if possible.  When exposed to sunlight, patients should wear protective clothing, sunglasses, and sunscreen.  The patient was instructed to call the office immediately if the following severe adverse effects occur:  hearing changes, easy bruising/bleeding, severe headache, or vision changes.  The patient verbalized understanding of the proper use and possible adverse effects of minocycline.  All of the patient's questions and concerns were addressed.
Winlevi Counseling:  I discussed with the patient the risks of topical clascoterone including but not limited to erythema, scaling, itching, and stinging. Patient voiced their understanding.
Dapsone Pregnancy And Lactation Text: This medication is Pregnancy Category C and is not considered safe during pregnancy or breast feeding.
Bactrim Pregnancy And Lactation Text: This medication is Pregnancy Category D and is known to cause fetal risk.  It is also excreted in breast milk.
Isotretinoin Counseling: Patient should get monthly blood tests, not donate blood, not drive at night if vision affected, not share medication, and not undergo elective surgery for 6 months after tx completed. Side effects reviewed, pt to contact office should one occur.
Topical Clindamycin Counseling: Patient counseled that this medication may cause skin irritation or allergic reactions.  In the event of skin irritation, the patient was advised to reduce the amount of the drug applied or use it less frequently.   The patient verbalized understanding of the proper use and possible adverse effects of clindamycin.  All of the patient's questions and concerns were addressed.
Topical Retinoid counseling:  Patient advised to apply a pea-sized amount only at bedtime and wait 30 minutes after washing their face before applying.  If too drying, patient may add a non-comedogenic moisturizer. The patient verbalized understanding of the proper use and possible adverse effects of retinoids.  All of the patient's questions and concerns were addressed.
Sarecycline Counseling: Patient advised regarding possible photosensitivity and discoloration of the teeth, skin, lips, tongue and gums.  Patient instructed to avoid sunlight, if possible.  When exposed to sunlight, patients should wear protective clothing, sunglasses, and sunscreen.  The patient was instructed to call the office immediately if the following severe adverse effects occur:  hearing changes, easy bruising/bleeding, severe headache, or vision changes.  The patient verbalized understanding of the proper use and possible adverse effects of sarecycline.  All of the patient's questions and concerns were addressed.
High Dose Vitamin A Pregnancy And Lactation Text: High dose vitamin A therapy is contraindicated during pregnancy and breast feeding.
Tazorac Pregnancy And Lactation Text: This medication is not safe during pregnancy. It is unknown if this medication is excreted in breast milk.
Detail Level: Zone
Azithromycin Pregnancy And Lactation Text: This medication is considered safe during pregnancy and is also secreted in breast milk.
Birth Control Pills Counseling: Birth Control Pill Counseling: I discussed with the patient the potential side effects of OCPs including but not limited to increased risk of stroke, heart attack, thrombophlebitis, deep venous thrombosis, hepatic adenomas, breast changes, GI upset, headaches, and depression.  The patient verbalized understanding of the proper use and possible adverse effects of OCPs. All of the patient's questions and concerns were addressed.
Birth Control Pills Pregnancy And Lactation Text: This medication should be avoided if pregnant and for the first 30 days post-partum.
Tazorac Counseling:  Patient advised that medication is irritating and drying.  Patient may need to apply sparingly and wash off after an hour before eventually leaving it on overnight.  The patient verbalized understanding of the proper use and possible adverse effects of tazorac.  All of the patient's questions and concerns were addressed.
Benzoyl Peroxide Counseling: Patient counseled that medicine may cause skin irritation and bleach clothing.  In the event of skin irritation, the patient was advised to reduce the amount of the drug applied or use it less frequently.   The patient verbalized understanding of the proper use and possible adverse effects of benzoyl peroxide.  All of the patient's questions and concerns were addressed.
Tetracycline Counseling: Patient counseled regarding possible photosensitivity and increased risk for sunburn.  Patient instructed to avoid sunlight, if possible.  When exposed to sunlight, patients should wear protective clothing, sunglasses, and sunscreen.  The patient was instructed to call the office immediately if the following severe adverse effects occur:  hearing changes, easy bruising/bleeding, severe headache, or vision changes.  The patient verbalized understanding of the proper use and possible adverse effects of tetracycline.  All of the patient's questions and concerns were addressed. Patient understands to avoid pregnancy while on therapy due to potential birth defects.
Doxycycline Counseling:  Patient counseled regarding possible photosensitivity and increased risk for sunburn.  Patient instructed to avoid sunlight, if possible.  When exposed to sunlight, patients should wear protective clothing, sunglasses, and sunscreen.  The patient was instructed to call the office immediately if the following severe adverse effects occur:  hearing changes, easy bruising/bleeding, severe headache, or vision changes.  The patient verbalized understanding of the proper use and possible adverse effects of doxycycline.  All of the patient's questions and concerns were addressed.
Aklief counseling:  Patient advised to apply a pea-sized amount only at bedtime and wait 30 minutes after washing their face before applying.  If too drying, patient may add a non-comedogenic moisturizer.  The most commonly reported side effects including irritation, redness, scaling, dryness, stinging, burning, itching, and increased risk of sunburn.  The patient verbalized understanding of the proper use and possible adverse effects of retinoids.  All of the patient's questions and concerns were addressed.
Erythromycin Counseling:  I discussed with the patient the risks of erythromycin including but not limited to GI upset, allergic reaction, drug rash, diarrhea, increase in liver enzymes, and yeast infections.
Bactrim Counseling:  I discussed with the patient the risks of sulfa antibiotics including but not limited to GI upset, allergic reaction, drug rash, diarrhea, dizziness, photosensitivity, and yeast infections.  Rarely, more serious reactions can occur including but not limited to aplastic anemia, agranulocytosis, methemoglobinemia, blood dyscrasias, liver or kidney failure, lung infiltrates or desquamative/blistering drug rashes.
Aklief Pregnancy And Lactation Text: It is unknown if this medication is safe to use during pregnancy.  It is unknown if this medication is excreted in breast milk.  Breastfeeding women should use the topical cream on the smallest area of the skin for the shortest time needed while breastfeeding.  Do not apply to nipple and areola.
Topical Sulfur Applications Counseling: Topical Sulfur Counseling: Patient counseled that this medication may cause skin irritation or allergic reactions.  In the event of skin irritation, the patient was advised to reduce the amount of the drug applied or use it less frequently.   The patient verbalized understanding of the proper use and possible adverse effects of topical sulfur application.  All of the patient's questions and concerns were addressed.
Spironolactone Counseling: Patient advised regarding risks of diarrhea, abdominal pain, hyperkalemia, birth defects (for female patients), liver toxicity and renal toxicity. The patient may need blood work to monitor liver and kidney function and potassium levels while on therapy. The patient verbalized understanding of the proper use and possible adverse effects of spironolactone.  All of the patient's questions and concerns were addressed.

## 2023-01-19 NOTE — PROCEDURE: ADDITIONAL NOTES
Additional Notes: Dapsone 7.5%/Niacinamide 4% gel faxed to [facility] and copy uploaded to patients chart
Detail Level: Simple
Render Risk Assessment In Note?: no

## 2023-11-05 ENCOUNTER — HEALTH MAINTENANCE LETTER (OUTPATIENT)
Age: 38
End: 2023-11-05

## 2024-02-22 ENCOUNTER — APPOINTMENT (OUTPATIENT)
Dept: URBAN - METROPOLITAN AREA CLINIC 257 | Age: 39
Setting detail: DERMATOLOGY
End: 2024-02-26

## 2024-02-22 DIAGNOSIS — L70.0 ACNE VULGARIS: ICD-10-CM

## 2024-02-22 PROCEDURE — OTHER PRESCRIPTION MEDICATION MANAGEMENT: OTHER

## 2024-02-22 PROCEDURE — OTHER MIPS QUALITY: OTHER

## 2024-02-22 PROCEDURE — 99213 OFFICE O/P EST LOW 20 MIN: CPT

## 2024-02-22 PROCEDURE — OTHER PRESCRIPTION: OTHER

## 2024-02-22 PROCEDURE — OTHER COUNSELING: OTHER

## 2024-02-22 RX ORDER — ADAPALENE AND BENZOYL PEROXIDE 3; 25 MG/G; MG/G
GEL TOPICAL
Qty: 45 | Refills: 3 | Status: ERX | COMMUNITY
Start: 2024-02-22

## 2024-02-22 RX ORDER — SPIRONOLACTONE 25 MG/1
TABLET, FILM COATED ORAL
Qty: 90 | Refills: 3 | Status: ERX | COMMUNITY
Start: 2024-02-22

## 2024-02-22 RX ORDER — DAPSONE 75 MG/G
GEL TOPICAL
Qty: 90 | Refills: 2 | Status: ERX | COMMUNITY
Start: 2024-02-22

## 2024-02-22 NOTE — PROCEDURE: COUNSELING
Dapsone Counseling: I discussed with the patient the risks of dapsone including but not limited to hemolytic anemia, agranulocytosis, rashes, methemoglobinemia, kidney failure, peripheral neuropathy, headaches, GI upset, and liver toxicity.  Patients who start dapsone require monitoring including baseline LFTs and weekly CBCs for the first month, then every month thereafter.  The patient verbalized understanding of the proper use and possible adverse effects of dapsone.  All of the patient's questions and concerns were addressed.
Topical Retinoid counseling:  Patient advised to apply a pea-sized amount only at bedtime and wait 30 minutes after washing their face before applying.  If too drying, patient may add a non-comedogenic moisturizer. The patient verbalized understanding of the proper use and possible adverse effects of retinoids.  All of the patient's questions and concerns were addressed.
Topical Sulfur Applications Pregnancy And Lactation Text: This medication is Pregnancy Category C and has an unknown safety profile during pregnancy. It is unknown if this topical medication is excreted in breast milk.
Tetracycline Counseling: Patient counseled regarding possible photosensitivity and increased risk for sunburn.  Patient instructed to avoid sunlight, if possible.  When exposed to sunlight, patients should wear protective clothing, sunglasses, and sunscreen.  The patient was instructed to call the office immediately if the following severe adverse effects occur:  hearing changes, easy bruising/bleeding, severe headache, or vision changes.  The patient verbalized understanding of the proper use and possible adverse effects of tetracycline.  All of the patient's questions and concerns were addressed. Patient understands to avoid pregnancy while on therapy due to potential birth defects.
Winlevi Pregnancy And Lactation Text: This medication is considered safe during pregnancy and breastfeeding.
Doxycycline Counseling:  Patient counseled regarding possible photosensitivity and increased risk for sunburn.  Patient instructed to avoid sunlight, if possible.  When exposed to sunlight, patients should wear protective clothing, sunglasses, and sunscreen.  The patient was instructed to call the office immediately if the following severe adverse effects occur:  hearing changes, easy bruising/bleeding, severe headache, or vision changes.  The patient verbalized understanding of the proper use and possible adverse effects of doxycycline.  All of the patient's questions and concerns were addressed.
High Dose Vitamin A Pregnancy And Lactation Text: High dose vitamin A therapy is contraindicated during pregnancy and breast feeding.
Minocycline Pregnancy And Lactation Text: This medication is Pregnancy Category D and not consider safe during pregnancy. It is also excreted in breast milk.
Azithromycin Pregnancy And Lactation Text: This medication is considered safe during pregnancy and is also secreted in breast milk.
Aklief Pregnancy And Lactation Text: It is unknown if this medication is safe to use during pregnancy.  It is unknown if this medication is excreted in breast milk.  Breastfeeding women should use the topical cream on the smallest area of the skin for the shortest time needed while breastfeeding.  Do not apply to nipple and areola.
Use Enhanced Medication Counseling?: No
Tazorac Counseling:  Patient advised that medication is irritating and drying.  Patient may need to apply sparingly and wash off after an hour before eventually leaving it on overnight.  The patient verbalized understanding of the proper use and possible adverse effects of tazorac.  All of the patient's questions and concerns were addressed.
Azelaic Acid Counseling: Patient counseled that medicine may cause skin irritation and to avoid applying near the eyes.  In the event of skin irritation, the patient was advised to reduce the amount of the drug applied or use it less frequently.   The patient verbalized understanding of the proper use and possible adverse effects of azelaic acid.  All of the patient's questions and concerns were addressed.
Sarecycline Counseling: Patient advised regarding possible photosensitivity and discoloration of the teeth, skin, lips, tongue and gums.  Patient instructed to avoid sunlight, if possible.  When exposed to sunlight, patients should wear protective clothing, sunglasses, and sunscreen.  The patient was instructed to call the office immediately if the following severe adverse effects occur:  hearing changes, easy bruising/bleeding, severe headache, or vision changes.  The patient verbalized understanding of the proper use and possible adverse effects of sarecycline.  All of the patient's questions and concerns were addressed.
Tazorac Pregnancy And Lactation Text: This medication is not safe during pregnancy. It is unknown if this medication is excreted in breast milk.
Erythromycin Pregnancy And Lactation Text: This medication is Pregnancy Category B and is considered safe during pregnancy. It is also excreted in breast milk.
Birth Control Pills Counseling: Birth Control Pill Counseling: I discussed with the patient the potential side effects of OCPs including but not limited to increased risk of stroke, heart attack, thrombophlebitis, deep venous thrombosis, hepatic adenomas, breast changes, GI upset, headaches, and depression.  The patient verbalized understanding of the proper use and possible adverse effects of OCPs. All of the patient's questions and concerns were addressed.
Topical Clindamycin Pregnancy And Lactation Text: This medication is Pregnancy Category B and is considered safe during pregnancy. It is unknown if it is excreted in breast milk.
Spironolactone Counseling: Patient advised regarding risks of diarrhea, abdominal pain, hyperkalemia, birth defects (for female patients), liver toxicity and renal toxicity. The patient may need blood work to monitor liver and kidney function and potassium levels while on therapy. The patient verbalized understanding of the proper use and possible adverse effects of spironolactone.  All of the patient's questions and concerns were addressed.
Isotretinoin Pregnancy And Lactation Text: This medication is Pregnancy Category X and is considered extremely dangerous during pregnancy. It is unknown if it is excreted in breast milk.
Benzoyl Peroxide Counseling: Patient counseled that medicine may cause skin irritation and bleach clothing.  In the event of skin irritation, the patient was advised to reduce the amount of the drug applied or use it less frequently.   The patient verbalized understanding of the proper use and possible adverse effects of benzoyl peroxide.  All of the patient's questions and concerns were addressed.
Dapsone Pregnancy And Lactation Text: This medication is Pregnancy Category C and is not considered safe during pregnancy or breast feeding.
High Dose Vitamin A Counseling: Side effects reviewed, pt to contact office should one occur.
Azithromycin Counseling:  I discussed with the patient the risks of azithromycin including but not limited to GI upset, allergic reaction, drug rash, diarrhea, and yeast infections.
Minocycline Counseling: Patient advised regarding possible photosensitivity and discoloration of the teeth, skin, lips, tongue and gums.  Patient instructed to avoid sunlight, if possible.  When exposed to sunlight, patients should wear protective clothing, sunglasses, and sunscreen.  The patient was instructed to call the office immediately if the following severe adverse effects occur:  hearing changes, easy bruising/bleeding, severe headache, or vision changes.  The patient verbalized understanding of the proper use and possible adverse effects of minocycline.  All of the patient's questions and concerns were addressed.
Aklief counseling:  Patient advised to apply a pea-sized amount only at bedtime and wait 30 minutes after washing their face before applying.  If too drying, patient may add a non-comedogenic moisturizer.  The most commonly reported side effects including irritation, redness, scaling, dryness, stinging, burning, itching, and increased risk of sunburn.  The patient verbalized understanding of the proper use and possible adverse effects of retinoids.  All of the patient's questions and concerns were addressed.
Topical Retinoid Pregnancy And Lactation Text: This medication is Pregnancy Category C. It is unknown if this medication is excreted in breast milk.
Winlevi Counseling:  I discussed with the patient the risks of topical clascoterone including but not limited to erythema, scaling, itching, and stinging. Patient voiced their understanding.
Patient Specific Counseling (Will Not Stick From Patient To Patient): - Reduce dose of Spironolactone to 25 mg daily to decrease frequent urination \\n\\n- Continue topical regimen
Bactrim Counseling:  I discussed with the patient the risks of sulfa antibiotics including but not limited to GI upset, allergic reaction, drug rash, diarrhea, dizziness, photosensitivity, and yeast infections.  Rarely, more serious reactions can occur including but not limited to aplastic anemia, agranulocytosis, methemoglobinemia, blood dyscrasias, liver or kidney failure, lung infiltrates or desquamative/blistering drug rashes.
Doxycycline Pregnancy And Lactation Text: This medication is Pregnancy Category D and not consider safe during pregnancy. It is also excreted in breast milk but is considered safe for shorter treatment courses.
Erythromycin Counseling:  I discussed with the patient the risks of erythromycin including but not limited to GI upset, allergic reaction, drug rash, diarrhea, increase in liver enzymes, and yeast infections.
Topical Clindamycin Counseling: Patient counseled that this medication may cause skin irritation or allergic reactions.  In the event of skin irritation, the patient was advised to reduce the amount of the drug applied or use it less frequently.   The patient verbalized understanding of the proper use and possible adverse effects of clindamycin.  All of the patient's questions and concerns were addressed.
Bactrim Pregnancy And Lactation Text: This medication is Pregnancy Category D and is known to cause fetal risk.  It is also excreted in breast milk.
Isotretinoin Counseling: Patient should get monthly blood tests, not donate blood, not drive at night if vision affected, not share medication, and not undergo elective surgery for 6 months after tx completed. Side effects reviewed, pt to contact office should one occur.
Azelaic Acid Pregnancy And Lactation Text: This medication is considered safe during pregnancy and breast feeding.
Spironolactone Pregnancy And Lactation Text: This medication can cause feminization of the male fetus and should be avoided during pregnancy. The active metabolite is also found in breast milk.
Benzoyl Peroxide Pregnancy And Lactation Text: This medication is Pregnancy Category C. It is unknown if benzoyl peroxide is excreted in breast milk.
Topical Sulfur Applications Counseling: Topical Sulfur Counseling: Patient counseled that this medication may cause skin irritation or allergic reactions.  In the event of skin irritation, the patient was advised to reduce the amount of the drug applied or use it less frequently.   The patient verbalized understanding of the proper use and possible adverse effects of topical sulfur application.  All of the patient's questions and concerns were addressed.
Detail Level: Zone
Birth Control Pills Pregnancy And Lactation Text: This medication should be avoided if pregnant and for the first 30 days post-partum.

## 2024-02-22 NOTE — PROCEDURE: PRESCRIPTION MEDICATION MANAGEMENT
Detail Level: Zone
Continue Regimen: Adapalene 0.3%-benzoyl peroxide 2.5% \\nAczone
Render In Strict Bullet Format?: No
Modify Regimen: Decrease to Spironolactone 25 mg daily

## 2024-03-18 RX ORDER — DAPSONE 75 MG/G
GEL TOPICAL
Qty: 90 | Refills: 2 | Status: ERX

## 2024-03-25 NOTE — PROCEDURE: MIPS QUALITY
As you may have been given sedative drugs and medications, you should not drive or operate heavy machinery the next 24 hours. You should avoid any heavy lifting, straining or running today. To the extent possible, defer any important decisions for the next 24 hours.
Quality 226: Preventive Care And Screening: Tobacco Use: Screening And Cessation Intervention: Patient screened for tobacco use and is an ex/non-smoker
Quality 110: Preventive Care And Screening: Influenza Immunization: Influenza Immunization Ordered or Recommended, but not Administered due to system reason
Quality 431: Preventive Care And Screening: Unhealthy Alcohol Use - Screening: Patient screened for unhealthy alcohol use using a single question and scores less than 2 times per year
Detail Level: Detailed
Quality 130: Documentation Of Current Medications In The Medical Record: Current Medications Documented

## 2024-09-24 ENCOUNTER — APPOINTMENT (OUTPATIENT)
Dept: URBAN - METROPOLITAN AREA CLINIC 257 | Age: 39
Setting detail: DERMATOLOGY
End: 2024-10-02

## 2024-09-24 VITALS — HEIGHT: 65 IN | WEIGHT: 150 LBS

## 2024-09-24 DIAGNOSIS — L73.8 OTHER SPECIFIED FOLLICULAR DISORDERS: ICD-10-CM

## 2024-09-24 PROCEDURE — OTHER MIPS QUALITY: OTHER

## 2024-09-24 PROCEDURE — 17110 DESTRUCT B9 LESION 1-14: CPT

## 2024-09-24 PROCEDURE — OTHER ELECTRODESICCATION: OTHER

## 2024-09-24 PROCEDURE — OTHER COUNSELING: OTHER

## 2024-09-24 ASSESSMENT — LOCATION DETAILED DESCRIPTION DERM
LOCATION DETAILED: RIGHT CENTRAL TEMPLE
LOCATION DETAILED: LEFT INFERIOR CENTRAL MALAR CHEEK
LOCATION DETAILED: LEFT INFERIOR FOREHEAD
LOCATION DETAILED: LEFT FOREHEAD
LOCATION DETAILED: RIGHT CENTRAL MALAR CHEEK
LOCATION DETAILED: RIGHT LATERAL FOREHEAD
LOCATION DETAILED: LEFT LATERAL TEMPLE
LOCATION DETAILED: LEFT NASAL ALA
LOCATION DETAILED: INFERIOR MID FOREHEAD
LOCATION DETAILED: LEFT SUPERIOR MEDIAL FOREHEAD

## 2024-09-24 ASSESSMENT — LOCATION SIMPLE DESCRIPTION DERM
LOCATION SIMPLE: LEFT NOSE
LOCATION SIMPLE: INFERIOR FOREHEAD
LOCATION SIMPLE: LEFT CHEEK
LOCATION SIMPLE: LEFT TEMPLE
LOCATION SIMPLE: RIGHT CHEEK
LOCATION SIMPLE: RIGHT FOREHEAD
LOCATION SIMPLE: RIGHT TEMPLE
LOCATION SIMPLE: LEFT FOREHEAD

## 2024-09-24 ASSESSMENT — LOCATION ZONE DERM
LOCATION ZONE: NOSE
LOCATION ZONE: FACE

## 2024-09-24 NOTE — HPI: SKIN LESION
What Type Of Note Output Would You Prefer (Optional)?: Standard Output
[email or web address]
Is This A New Presentation, Or A Follow-Up?: Skin Lesions
Additional History: Patient states she would like her sebaceous hyperplasia treated with electrocautery. Patient reports she has had this done in the past. Patient denies  any other concerns.

## 2024-09-24 NOTE — PROCEDURE: ELECTRODESICCATION
Consent: The patient's consent was obtained including but not limited to risks of crusting, scabbing, blistering, scarring, darker or lighter pigmentary change, recurrence, incomplete removal and infection.
Post-Care Instructions: I reviewed with the patient in detail post-care instructions. Patient is to wear sunprotection, and avoid picking at any of the treated lesions. Pt may apply Vaseline to crusted or scabbing areas
Include Z78.9 (Other Specified Conditions Influencing Health Status) As An Associated Diagnosis?: No
Anesthesia Type: 1% lidocaine with epinephrine
Medical Necessity Clause: This procedure was medically necessary because the lesions that were treated were:
Medical Necessity Information: It is in your best interest to select a reason for this procedure from the list below. All of these items fulfill various CMS LCD requirements except the new and changing color options.
Detail Level: Simple

## 2025-03-17 ENCOUNTER — LAB REQUISITION (OUTPATIENT)
Dept: LAB | Facility: CLINIC | Age: 40
End: 2025-03-17
Payer: COMMERCIAL

## 2025-03-17 DIAGNOSIS — Z12.4 ENCOUNTER FOR SCREENING FOR MALIGNANT NEOPLASM OF CERVIX: ICD-10-CM

## 2025-03-17 PROCEDURE — 87624 HPV HI-RISK TYP POOLED RSLT: CPT | Mod: ORL

## 2025-03-17 PROCEDURE — G0145 SCR C/V CYTO,THINLAYER,RESCR: HCPCS | Mod: ORL

## 2025-03-19 LAB
HPV HR 12 DNA CVX QL NAA+PROBE: NEGATIVE
HPV16 DNA CVX QL NAA+PROBE: NEGATIVE
HPV18 DNA CVX QL NAA+PROBE: NEGATIVE
HUMAN PAPILLOMA VIRUS FINAL DIAGNOSIS: NORMAL

## 2025-03-20 LAB
BKR AP ASSOCIATED HPV REPORT: NORMAL
BKR LAB AP GYN ADEQUACY: NORMAL
BKR LAB AP GYN INTERPRETATION: NORMAL
BKR LAB AP LMP: NORMAL
BKR LAB AP PREVIOUS ABNL DX: NORMAL
BKR LAB AP PREVIOUS ABNORMAL: NORMAL
PATH REPORT.COMMENTS IMP SPEC: NORMAL
PATH REPORT.COMMENTS IMP SPEC: NORMAL
PATH REPORT.RELEVANT HX SPEC: NORMAL

## 2025-04-03 ENCOUNTER — APPOINTMENT (OUTPATIENT)
Dept: URBAN - METROPOLITAN AREA CLINIC 257 | Age: 40
Setting detail: DERMATOLOGY
End: 2025-04-03

## 2025-04-03 DIAGNOSIS — B00.1 HERPESVIRAL VESICULAR DERMATITIS: ICD-10-CM

## 2025-04-03 DIAGNOSIS — L70.0 ACNE VULGARIS: ICD-10-CM

## 2025-04-03 DIAGNOSIS — L73.8 OTHER SPECIFIED FOLLICULAR DISORDERS: ICD-10-CM

## 2025-04-03 PROCEDURE — 17110 DESTRUCT B9 LESION 1-14: CPT

## 2025-04-03 PROCEDURE — OTHER PRESCRIPTION MEDICATION MANAGEMENT: OTHER

## 2025-04-03 PROCEDURE — 99213 OFFICE O/P EST LOW 20 MIN: CPT | Mod: 25

## 2025-04-03 PROCEDURE — OTHER PRESCRIPTION: OTHER

## 2025-04-03 PROCEDURE — OTHER ELECTRODESICCATION: OTHER

## 2025-04-03 PROCEDURE — OTHER COUNSELING: OTHER

## 2025-04-03 PROCEDURE — OTHER MIPS QUALITY: OTHER

## 2025-04-03 RX ORDER — ADAPALENE AND BENZOYL PEROXIDE 3; 25 MG/G; MG/G
GEL TOPICAL
Qty: 45 | Refills: 3 | Status: ERX

## 2025-04-03 RX ORDER — VALACYCLOVIR HYDROCHLORIDE 1 G/1
TABLET, FILM COATED ORAL
Qty: 12 | Refills: 0 | Status: ERX | COMMUNITY
Start: 2025-04-03

## 2025-04-03 RX ORDER — SPIRONOLACTONE 25 MG/1
TABLET, FILM COATED ORAL
Qty: 90 | Refills: 3 | Status: ERX

## 2025-04-03 RX ORDER — DAPSONE 75 MG/G
GEL TOPICAL
Qty: 90 | Refills: 2 | Status: ERX

## 2025-04-03 ASSESSMENT — LOCATION SIMPLE DESCRIPTION DERM
LOCATION SIMPLE: LEFT CHEEK
LOCATION SIMPLE: LEFT FOREHEAD
LOCATION SIMPLE: RIGHT FOREHEAD
LOCATION SIMPLE: LEFT TEMPLE
LOCATION SIMPLE: RIGHT LIP
LOCATION SIMPLE: LEFT NOSE
LOCATION SIMPLE: INFERIOR FOREHEAD
LOCATION SIMPLE: RIGHT CHEEK

## 2025-04-03 ASSESSMENT — LOCATION DETAILED DESCRIPTION DERM
LOCATION DETAILED: LEFT FOREHEAD
LOCATION DETAILED: INFERIOR MID FOREHEAD
LOCATION DETAILED: RIGHT INFERIOR MEDIAL FOREHEAD
LOCATION DETAILED: LEFT INFERIOR FOREHEAD
LOCATION DETAILED: LEFT INFERIOR CENTRAL MALAR CHEEK
LOCATION DETAILED: LEFT CENTRAL TEMPLE
LOCATION DETAILED: LEFT LATERAL FOREHEAD
LOCATION DETAILED: RIGHT ORAL COMMISSURE
LOCATION DETAILED: RIGHT INFERIOR CENTRAL MALAR CHEEK
LOCATION DETAILED: LEFT NASAL ALA

## 2025-04-03 ASSESSMENT — LOCATION ZONE DERM
LOCATION ZONE: NOSE
LOCATION ZONE: FACE
LOCATION ZONE: LIP

## 2025-04-03 NOTE — PROCEDURE: PRESCRIPTION MEDICATION MANAGEMENT
Detail Level: Zone
Continue Regimen: Spironolactone 25 mg daily\\nAdapalene 0.3%-benzoyl peroxide 2.5% PRN\\nAczone daily
Render In Strict Bullet Format?: No
Initiate Treatment: Valtrex 2 mg at onset of symptoms and 2 mg 12 hours later

## 2025-04-03 NOTE — PROCEDURE: ELECTRODESICCATION
Include Z78.9 (Other Specified Conditions Influencing Health Status) As An Associated Diagnosis?: No
Medical Necessity Information: It is in your best interest to select a reason for this procedure from the list below. All of these items fulfill various CMS LCD requirements except the new and changing color options.
Topical Anesthesia Type: BLT ointment (benzocaine 20%, lidocaine 10%, tetracaine 10%)
Consent: The patient's consent was obtained including but not limited to risks of crusting, scabbing, blistering, scarring, darker or lighter pigmentary change, recurrence, incomplete removal and infection.
Medical Necessity Clause: This procedure was medically necessary because the lesions that were treated were:
Post-Care Instructions: I reviewed with the patient in detail post-care instructions. Patient is to wear sunprotection, and avoid picking at any of the treated lesions. Pt may apply Vaseline to crusted or scabbing areas
Detail Level: Simple

## 2025-04-03 NOTE — PROCEDURE: COUNSELING
Dapsone Counseling: I discussed with the patient the risks of dapsone including but not limited to hemolytic anemia, agranulocytosis, rashes, methemoglobinemia, kidney failure, peripheral neuropathy, headaches, GI upset, and liver toxicity.  Patients who start dapsone require monitoring including baseline LFTs and weekly CBCs for the first month, then every month thereafter.  The patient verbalized understanding of the proper use and possible adverse effects of dapsone.  All of the patient's questions and concerns were addressed.
Topical Retinoid counseling:  Patient advised to apply a pea-sized amount only at bedtime and wait 30 minutes after washing their face before applying.  If too drying, patient may add a non-comedogenic moisturizer. The patient verbalized understanding of the proper use and possible adverse effects of retinoids.  All of the patient's questions and concerns were addressed.
Topical Sulfur Applications Pregnancy And Lactation Text: This medication is Pregnancy Category C and has an unknown safety profile during pregnancy. It is unknown if this topical medication is excreted in breast milk.
Tetracycline Counseling: Patient counseled regarding possible photosensitivity and increased risk for sunburn.  Patient instructed to avoid sunlight, if possible.  When exposed to sunlight, patients should wear protective clothing, sunglasses, and sunscreen.  The patient was instructed to call the office immediately if the following severe adverse effects occur:  hearing changes, easy bruising/bleeding, severe headache, or vision changes.  The patient verbalized understanding of the proper use and possible adverse effects of tetracycline.  All of the patient's questions and concerns were addressed. Patient understands to avoid pregnancy while on therapy due to potential birth defects.
Winlevi Pregnancy And Lactation Text: This medication is considered safe during pregnancy and breastfeeding.
Doxycycline Counseling:  Patient counseled regarding possible photosensitivity and increased risk for sunburn.  Patient instructed to avoid sunlight, if possible.  When exposed to sunlight, patients should wear protective clothing, sunglasses, and sunscreen.  The patient was instructed to call the office immediately if the following severe adverse effects occur:  hearing changes, easy bruising/bleeding, severe headache, or vision changes.  The patient verbalized understanding of the proper use and possible adverse effects of doxycycline.  All of the patient's questions and concerns were addressed.
High Dose Vitamin A Pregnancy And Lactation Text: High dose vitamin A therapy is contraindicated during pregnancy and breast feeding.
Minocycline Pregnancy And Lactation Text: This medication is Pregnancy Category D and not consider safe during pregnancy. It is also excreted in breast milk.
Azithromycin Pregnancy And Lactation Text: This medication is considered safe during pregnancy and is also secreted in breast milk.
Aklief Pregnancy And Lactation Text: It is unknown if this medication is safe to use during pregnancy.  It is unknown if this medication is excreted in breast milk.  Breastfeeding women should use the topical cream on the smallest area of the skin for the shortest time needed while breastfeeding.  Do not apply to nipple and areola.
Use Enhanced Medication Counseling?: No
Tazorac Counseling:  Patient advised that medication is irritating and drying.  Patient may need to apply sparingly and wash off after an hour before eventually leaving it on overnight.  The patient verbalized understanding of the proper use and possible adverse effects of tazorac.  All of the patient's questions and concerns were addressed.
Azelaic Acid Counseling: Patient counseled that medicine may cause skin irritation and to avoid applying near the eyes.  In the event of skin irritation, the patient was advised to reduce the amount of the drug applied or use it less frequently.   The patient verbalized understanding of the proper use and possible adverse effects of azelaic acid.  All of the patient's questions and concerns were addressed.
Sarecycline Counseling: Patient advised regarding possible photosensitivity and discoloration of the teeth, skin, lips, tongue and gums.  Patient instructed to avoid sunlight, if possible.  When exposed to sunlight, patients should wear protective clothing, sunglasses, and sunscreen.  The patient was instructed to call the office immediately if the following severe adverse effects occur:  hearing changes, easy bruising/bleeding, severe headache, or vision changes.  The patient verbalized understanding of the proper use and possible adverse effects of sarecycline.  All of the patient's questions and concerns were addressed.
Tazorac Pregnancy And Lactation Text: This medication is not safe during pregnancy. It is unknown if this medication is excreted in breast milk.
Erythromycin Pregnancy And Lactation Text: This medication is Pregnancy Category B and is considered safe during pregnancy. It is also excreted in breast milk.
Birth Control Pills Counseling: Birth Control Pill Counseling: I discussed with the patient the potential side effects of OCPs including but not limited to increased risk of stroke, heart attack, thrombophlebitis, deep venous thrombosis, hepatic adenomas, breast changes, GI upset, headaches, and depression.  The patient verbalized understanding of the proper use and possible adverse effects of OCPs. All of the patient's questions and concerns were addressed.
Topical Clindamycin Pregnancy And Lactation Text: This medication is Pregnancy Category B and is considered safe during pregnancy. It is unknown if it is excreted in breast milk.
Spironolactone Counseling: Patient advised regarding risks of diarrhea, abdominal pain, hyperkalemia, birth defects (for female patients), liver toxicity and renal toxicity. The patient may need blood work to monitor liver and kidney function and potassium levels while on therapy. The patient verbalized understanding of the proper use and possible adverse effects of spironolactone.  All of the patient's questions and concerns were addressed.
Isotretinoin Pregnancy And Lactation Text: This medication is Pregnancy Category X and is considered extremely dangerous during pregnancy. It is unknown if it is excreted in breast milk.
Benzoyl Peroxide Counseling: Patient counseled that medicine may cause skin irritation and bleach clothing.  In the event of skin irritation, the patient was advised to reduce the amount of the drug applied or use it less frequently.   The patient verbalized understanding of the proper use and possible adverse effects of benzoyl peroxide.  All of the patient's questions and concerns were addressed.
Dapsone Pregnancy And Lactation Text: This medication is Pregnancy Category C and is not considered safe during pregnancy or breast feeding.
High Dose Vitamin A Counseling: Side effects reviewed, pt to contact office should one occur.
Azithromycin Counseling:  I discussed with the patient the risks of azithromycin including but not limited to GI upset, allergic reaction, drug rash, diarrhea, and yeast infections.
Minocycline Counseling: Patient advised regarding possible photosensitivity and discoloration of the teeth, skin, lips, tongue and gums.  Patient instructed to avoid sunlight, if possible.  When exposed to sunlight, patients should wear protective clothing, sunglasses, and sunscreen.  The patient was instructed to call the office immediately if the following severe adverse effects occur:  hearing changes, easy bruising/bleeding, severe headache, or vision changes.  The patient verbalized understanding of the proper use and possible adverse effects of minocycline.  All of the patient's questions and concerns were addressed.
Aklief counseling:  Patient advised to apply a pea-sized amount only at bedtime and wait 30 minutes after washing their face before applying.  If too drying, patient may add a non-comedogenic moisturizer.  The most commonly reported side effects including irritation, redness, scaling, dryness, stinging, burning, itching, and increased risk of sunburn.  The patient verbalized understanding of the proper use and possible adverse effects of retinoids.  All of the patient's questions and concerns were addressed.
Topical Retinoid Pregnancy And Lactation Text: This medication is Pregnancy Category C. It is unknown if this medication is excreted in breast milk.
Winlevi Counseling:  I discussed with the patient the risks of topical clascoterone including but not limited to erythema, scaling, itching, and stinging. Patient voiced their understanding.
Bactrim Counseling:  I discussed with the patient the risks of sulfa antibiotics including but not limited to GI upset, allergic reaction, drug rash, diarrhea, dizziness, photosensitivity, and yeast infections.  Rarely, more serious reactions can occur including but not limited to aplastic anemia, agranulocytosis, methemoglobinemia, blood dyscrasias, liver or kidney failure, lung infiltrates or desquamative/blistering drug rashes.
Doxycycline Pregnancy And Lactation Text: This medication is Pregnancy Category D and not consider safe during pregnancy. It is also excreted in breast milk but is considered safe for shorter treatment courses.
Erythromycin Counseling:  I discussed with the patient the risks of erythromycin including but not limited to GI upset, allergic reaction, drug rash, diarrhea, increase in liver enzymes, and yeast infections.
Topical Clindamycin Counseling: Patient counseled that this medication may cause skin irritation or allergic reactions.  In the event of skin irritation, the patient was advised to reduce the amount of the drug applied or use it less frequently.   The patient verbalized understanding of the proper use and possible adverse effects of clindamycin.  All of the patient's questions and concerns were addressed.
Bactrim Pregnancy And Lactation Text: This medication is Pregnancy Category D and is known to cause fetal risk.  It is also excreted in breast milk.
Isotretinoin Counseling: Patient should get monthly blood tests, not donate blood, not drive at night if vision affected, not share medication, and not undergo elective surgery for 6 months after tx completed. Side effects reviewed, pt to contact office should one occur.
Azelaic Acid Pregnancy And Lactation Text: This medication is considered safe during pregnancy and breast feeding.
Spironolactone Pregnancy And Lactation Text: This medication can cause feminization of the male fetus and should be avoided during pregnancy. The active metabolite is also found in breast milk.
Benzoyl Peroxide Pregnancy And Lactation Text: This medication is Pregnancy Category C. It is unknown if benzoyl peroxide is excreted in breast milk.
Topical Sulfur Applications Counseling: Topical Sulfur Counseling: Patient counseled that this medication may cause skin irritation or allergic reactions.  In the event of skin irritation, the patient was advised to reduce the amount of the drug applied or use it less frequently.   The patient verbalized understanding of the proper use and possible adverse effects of topical sulfur application.  All of the patient's questions and concerns were addressed.
Detail Level: Zone
Birth Control Pills Pregnancy And Lactation Text: This medication should be avoided if pregnant and for the first 30 days post-partum.
Detail Level: Detailed

## 2025-04-03 NOTE — HPI: SKIN LESION
What Type Of Note Output Would You Prefer (Optional)?: Standard Output
Is This A New Presentation, Or A Follow-Up?: Skin Lesion
Additional History: Patient reports that she has a cold sore like lesion near her right lip/corner of mouth. Patient states she would like medication to help get rid of this. Patient denies any other concerns.

## 2025-05-23 RX ORDER — ADAPALENE AND BENZOYL PEROXIDE 3; 25 MG/G; MG/G
GEL TOPICAL
Qty: 45 | Refills: 3 | Status: ERX

## 2025-05-27 ENCOUNTER — RX ONLY (RX ONLY)
Age: 40
End: 2025-05-27

## 2025-05-27 RX ORDER — ADAPALENE/BENZOYL PEROX/NIACIN 0.3-2.5-4%
GEL (GRAM) TOPICAL
Qty: 30 | Refills: 3 | Status: ERX | COMMUNITY
Start: 2025-05-27

## (undated) RX ORDER — FENTANYL CITRATE 50 UG/ML
INJECTION, SOLUTION INTRAMUSCULAR; INTRAVENOUS
Status: DISPENSED
Start: 2019-04-22